# Patient Record
Sex: FEMALE | Race: WHITE | Employment: OTHER | URBAN - METROPOLITAN AREA
[De-identification: names, ages, dates, MRNs, and addresses within clinical notes are randomized per-mention and may not be internally consistent; named-entity substitution may affect disease eponyms.]

---

## 2017-03-29 ENCOUNTER — OFFICE VISIT (OUTPATIENT)
Dept: CARDIOLOGY CLINIC | Age: 82
End: 2017-03-29

## 2017-03-29 VITALS
HEIGHT: 63 IN | HEART RATE: 84 BPM | WEIGHT: 237 LBS | SYSTOLIC BLOOD PRESSURE: 140 MMHG | OXYGEN SATURATION: 96 % | RESPIRATION RATE: 19 BRPM | BODY MASS INDEX: 41.99 KG/M2 | DIASTOLIC BLOOD PRESSURE: 101 MMHG

## 2017-03-29 DIAGNOSIS — E78.5 DYSLIPIDEMIA: ICD-10-CM

## 2017-03-29 DIAGNOSIS — I25.10 ATHEROSCLEROSIS OF NATIVE CORONARY ARTERY OF NATIVE HEART WITHOUT ANGINA PECTORIS: Primary | ICD-10-CM

## 2017-03-29 DIAGNOSIS — I10 ESSENTIAL HYPERTENSION: ICD-10-CM

## 2017-03-29 RX ORDER — ASPIRIN 81 MG/1
81 TABLET ORAL DAILY
Qty: 60 TAB | Refills: 12
Start: 2017-03-29 | End: 2018-10-18 | Stop reason: SDUPTHER

## 2017-03-29 RX ORDER — FEBUXOSTAT 40 MG/1
40 TABLET ORAL DAILY
COMMUNITY
Start: 2017-03-21

## 2017-03-29 RX ORDER — CARVEDILOL 25 MG/1
25 TABLET ORAL 2 TIMES DAILY
Qty: 180 TAB | Refills: 3 | Status: SHIPPED | OUTPATIENT
Start: 2017-03-29 | End: 2018-03-15 | Stop reason: SDUPTHER

## 2017-03-29 NOTE — PROGRESS NOTES
Alexandre Garrison MD. [facility]              Patient: Chichi Diaz  : 1935      Today's Date: 3/29/2017            HISTORY OF PRESENT ILLNESS:     History of Present Illness:  Ms. Yonatan Bullock is here for follow-up. She is living with her daughter. She had bronchitis recently and doing better. She is still SOB. No CP. No orthopnea, but does raise head due to GI problems. BP at home runs 140/80's. PAST MEDICAL HISTORY:     Past Medical History:   Diagnosis Date    Back pain     Chronic kidney disease     Coronary atherosclerosis of native coronary artery     100% LAD stenosis initially medically managed (had seen cardiac surgery).   On 13 underwent PCI of  with EZRA    Dyslipidemia     Hematoma     L and R hematoma post cath 13 requiring 5 units PRBC's    IBS (irritable bowel syndrome)     Insomnia     sleep study  unremarkable    Myalgia     had muscle pain on statins    Obesity     Parathyroid adenoma     surgery 2014    Spinal stenosis     surgery     TIA (transient ischemic attack)     possible TIA 3/14, although workup normal    Unspecified essential hypertension          Past Surgical History:   Procedure Laterality Date    CARDIAC SURG PROCEDURE UNLIST       LAD     HX HYSTERECTOMY      HX LUMBAR FUSION  2013    L4-5    HX TONSILLECTOMY             MEDICATIONS:     Current Outpatient Prescriptions   Medication Sig Dispense Refill    ULORIC 40 mg tab tablet       fluvastatin (LESCOL) 20 mg capsule TAKE 1 CAPSULE BY MOUTH EVERY NIGHT AT BEDTIME 30 Cap 0    carvedilol (COREG) 12.5 mg tablet TAKE 1 TABLET BY MOUTH TWICE A DAY WITH MEALS 60 Tab 0    clopidogrel (PLAVIX) 75 mg tab TAKE 1 TABLET BY MOUTH EVERY DAY 30 Tab 11    isosorbide mononitrate ER (IMDUR) 30 mg tablet TAKE 1 TABLET BY MOUTH EVERY DAY 30 Tab 11    losartan (COZAAR) 50 mg tablet TAKE 2 TABLETS BY MOUTH EVERY DAY 60 Tab 6    fluvastatin (LESCOL) 20 mg capsule TAKE ONE CAPSULE BY MOUTH EVERY NIGHT 30 Cap 2    co-enzyme Q-10 (CO Q-10) 50 mg capsule Take 4 Caps by mouth two (2) times a day. 60 Cap 0    acetaminophen (TYLENOL ARTHRITIS PAIN) 650 mg CR tablet Take 650 mg by mouth every eight (8) hours as needed for Pain.  loperamide (IMODIUM) 2 mg capsule Take 2 mg by mouth as needed for Diarrhea.  sodium bicarbonate 325 mg tablet Take 650 mg by mouth nightly.  aspirin delayed-release 81 mg tablet Take 1 Tab by mouth two (2) times a day. 60 Tab 0    triamcinolone acetonide (KENALOG) 0.1 % topical cream Apply  to affected area two (2) times daily as needed. use thin layer      L GASSERI/B BIFIDUM/B LONGUM (Trubates PO) Take 1 Cap by mouth daily.  therapeutic multivitamin (THERAGRAN) tablet Take 1 Tab by mouth daily.  ferrous sulfate (SLOW FE) 142 mg (45 mg iron) ER tablet Take 142 mg by mouth two (2) times a day.  ezetimibe (ZETIA) 10 mg tablet Take 10 mg by mouth nightly.  cholecalciferol, vitamin D3, (VITAMIN D3) 2,000 unit Tab Take 2,000 Units by mouth two (2) times a day. Allergies   Allergen Reactions    Aloe Vera Rash    Sulfa (Sulfonamide Antibiotics) Rash and Other (comments)     Upset stomach              SOCIAL HISTORY:     Social History   Substance Use Topics    Smoking status: Never Smoker    Smokeless tobacco: Never Used    Alcohol use Yes      Comment: 2 glasses of wine monthly            FAMILY HISTORY:     Family History   Problem Relation Age of Onset    Heart Disease Mother     Stroke Father             REVIEW OF SYMPTOMS:      Review of Symptoms:  Constitutional: Negative for fever, chills  HEENT: Negative for nosebleeds, tinnitus, and vision changes. Respiratory: Negative for cough, wheezing  Cardiovascular: Negative for syncope, and PND. Gastrointestinal: Negative for abdominal pain, diarrhea, melena. Genitourinary: Negative for dysuria  Musculoskeletal: Negative for myalgias.    Skin: + psoriasis   Heme: No problems bleeding. Neurological: Negative for speech change and focal weakness.                  PHYSICAL EXAM:      Physical Exam:  Visit Vitals    BP (!) 140/101 (BP 1 Location: Right arm, BP Patient Position: Sitting)    Pulse 84    Resp 19    Ht 5' 2.5\" (1.588 m)    Wt 237 lb (107.5 kg)    SpO2 96%    BMI 42.66 kg/m2      Patient appears generally well, mood and affect are appropriate and pleasant. HEENT: Hearing intact, non-icteric, normocephalic, atraumatic. Neck Exam: Supple, no bruits or JVD  Lung Exam: Clear to auscultation, even breath sounds. Cardiac Exam: Regular rate and rhythm with no murmur  Abdomen: Soft, non-tender, normal bowel sounds. Extremities: Moves all ext well. Trace lower extremity edema. Psych: Appropriate affect  Neuro - Grossly intact               LABS / OTHER STUDIES:        Labs 2/28/14 - CBC OK  Labs 3/24/15- chol 153, , LDL 54, HDL 52      CARDIAC DIAGNOSTICS:           Cardiac Evaluation Includes:    1. Echo, December 2, 2009: Normal left ventricular size and function, EF 55% to 33%, grade I diastolic dysfunction. Minimal aortic valve sclerosis with trace aortic insufficiency. 2. Cardiac cath, December 11, 2009: Significant one-vessel coronary artery disease. There is a 90% proximal LAD lesion over a long segment followed by a total occlusion in the mid LAD. The distal LAD is small to moderate sized and fills with left-to-left collaterals. There is mild disease elsewhere. LVEDP was 16, LVEF 60%. 3. Lexiscan Myoview 2/10: small, partially reversible apical defect; LVEF 80%    4. Lexiscan Cardiolite 6/24/13 - normal MPPI, LVEF 59%    5. Cath 7/9/13 - 100% pLAD stenosis, LVEF 65%    6. Cath 7/25/13 - EZRA to LAD  7. Holter 2/12/15 - Normal  8. Echo 12/6/15 - LVEF 60 % to 65 %. Grade 1 diastolic dysfunction.  Mild LAE  9.  CATH: 12/7/2015: L Main: Nml, LAD: Stent patent; dLAD: small - patent/diffuse dz; o/pD1/D2 mild dz, LCflex: Med to large; MLI; OM1 and OM2 - MLI; RCA: Large; pRCA: 80%;PLB and PDA - med; PDA - prox diffuse 40%, LVEDP: 18, LVEF: 55%  ---- s/p EZRA (3 x 23) -> pRCA             EKG 8/14 - NSR, 1st degree AVB  EKG 2/12/15 - NSR, PRWP  EKG 11/25/15 - NSR, PRWP            ASSESSMENT AND PLAN:        Assessment and Plan:   1) CAD and chest pain  - Cardiac Cath Dec 2009 - Significant 1 vessel CAD - 90% proximal LAD lesion followed by total mid-LAD lesion with distal LAD filling by left to left collatorals and right to distal left collaterals.    - She was managed medically for years (after seeing cardiac surgery).    - Due to worsening symptoms she proceeded to PCI of  on 7/25/13 by Dr. Edmar Looney. Her breathing improved s/p PCI.    - She was pain free after PCI in 2013 until about November 1, 2015. Since then she's had some typical chest pain. She also has indigestion which is different.  She was eventually admitted for chest pain and had a cath.    - CATH: 12/7/2015: L Main: Nml, LAD: Stent patent; dLAD: small - patent/diffuse dz; o/pD1/D2 mild dz, LCflex: Med to large; MLI; OM1 and OM2 - MLI; RCA: Large; pRCA: 80%;PLB and PDA - med; PDA - prox diffuse 40%, LVEDP: 18, LVEF: 55%  ---- s/p EZRA (3 x 23) -> pRCA    - She was admitted to [address] [place] [address] in June 2016 - workup there OK per patient   - No further chest pain   - Cont DAPT for now, but consider stopping plavix next year      2) HTN    - increase Coreg to 25 mg BID  - continue other medications      3) Heart racing  - continue beta-blockers  - Holter 2/15 was normal    - She is doing better lately      4) Stage 3 CKD - followed by Dr. Jannie Burnett      5) Dyslipidemia - She is tolerating fluvastatin (could not tolerate other statins) and Zetia.    - Prior lipids OK     6) See me back in 6 months or earlier based on her BP does. Patient expressed understanding of the plan - questions were answered.    She has 2 daughters - living with the youngest.   Alisson Kaufman MD, [address] [facility] [address]  [facility] [address]  [address] [facility], [facility] [address]  [address], [address]  [place], [address] [place] [address]  Ph: [phone]   Ph [phone]

## 2017-03-29 NOTE — MR AVS SNAPSHOT
Visit Information Date & Time Provider Department Dept. Phone Encounter #  
 3/29/2017  4:00 PM Nayan Foster MD [facility] Mckenna Frausto [phone] [id number] Upcoming Health Maintenance Date Due DTaP/Tdap/Td series (1 - Tdap) 10/17/1956 ZOSTER VACCINE AGE 60> 10/17/1995 GLAUCOMA SCREENING Q2Y 10/17/2000 OSTEOPOROSIS SCREENING (DEXA) 10/17/2000 MEDICARE YEARLY EXAM 10/17/2000 INFLUENZA AGE 9 TO ADULT 8/1/2016 Pneumococcal 65+ Low/Medium Risk (2 of 2 - PPSV23) 1/1/2017 Allergies as of 3/29/2017  Review Complete On: 3/29/2017 By: Nayan Foster MD  
  
 Severity Noted Reaction Type Reactions Aloe Vera  07/22/2013    Rash  
 Sulfa (Sulfonamide Antibiotics)  02/02/2011    Rash, Other (comments) Upset stomach Current Immunizations  Reviewed on 12/4/2015 Name Date Influenza Vaccine (Madin Dover Canine Kidney) PF 12/8/2015 11:40 AM  
 Pneumococcal Vaccine (Unspecified Type) 1/1/2012 Not reviewed this visit Vitals BP Pulse Resp Height(growth percentile) Weight(growth percentile) SpO2  
 (!) 140/101 (BP 1 Location: Right arm, BP Patient Position: Sitting) 84 19 5' 2.5\" (1.588 m) 237 lb (107.5 kg) 96% BMI OB Status Smoking Status 42.66 kg/m2 Hysterectomy Never Smoker Vitals History BMI and BSA Data Body Mass Index Body Surface Area  
 42.66 kg/m 2 2.18 m 2 Preferred Pharmacy Pharmacy Name Phone [id number] [place], [address]) [phone] Your Updated Medication List  
  
   
This list is accurate as of: 3/29/17  4:38 PM.  Always use your most recent med list.  
  
  
  
  
 aspirin delayed-release 81 mg tablet Take 1 Tab by mouth daily. carvedilol 25 mg tablet Commonly known as:  Lisa Buster Take 1 Tab by mouth two (2) times a day. clopidogrel 75 mg Tab Commonly known as:  PLAVIX TAKE 1 TABLET BY MOUTH EVERY DAY  
  
 co-enzyme Q-10 50 mg capsule Commonly known as:  CO Q-10 Take 4 Caps by mouth two (2) times a day. ferrous sulfate 142 mg (45 mg iron) ER tablet Commonly known as:  SLOW FE Take 142 mg by mouth two (2) times a day. * fluvastatin 20 mg capsule Commonly known as:  LESCOL TAKE ONE CAPSULE BY MOUTH EVERY NIGHT * fluvastatin 20 mg capsule Commonly known as:  LESCOL TAKE 1 CAPSULE BY MOUTH EVERY NIGHT AT BEDTIME  
  
 isosorbide mononitrate ER 30 mg tablet Commonly known as:  IMDUR  
TAKE 1 TABLET BY MOUTH EVERY DAY  
  
 loperamide 2 mg capsule Commonly known as:  IMODIUM Take 2 mg by mouth as needed for Diarrhea.  
  
 losartan 50 mg tablet Commonly known as:  COZAAR  
TAKE 2 TABLETS BY MOUTH EVERY DAY [address] Take 1 Cap by mouth daily. sodium bicarbonate 325 mg tablet Take 650 mg by mouth nightly. therapeutic multivitamin tablet Commonly known as:  [facility] Take 1 Tab by mouth daily. triamcinolone acetonide 0.1 % topical cream  
Commonly known as:  KENALOG Apply  to affected area two (2) times daily as needed. use thin layer TYLENOL ARTHRITIS PAIN 650 mg CR tablet Generic drug:  acetaminophen Take 650 mg by mouth every eight (8) hours as needed for Pain. ULORIC 40 mg Tab tablet Generic drug:  febuxostat VITAMIN D3 2,000 unit Tab Generic drug:  cholecalciferol (vitamin D3) Take 2,000 Units by mouth two (2) times a day. ZETIA 10 mg tablet Generic drug:  ezetimibe Take 10 mg by mouth nightly. * Notice: This list has 2 medication(s) that are the same as other medications prescribed for you. Read the directions carefully, and ask your doctor or other care provider to review them with you. Prescriptions Sent to Pharmacy Refills  
 carvedilol (COREG) 25 mg tablet 3 Sig: Take 1 Tab by mouth two (2) times a day.   
 Class: Normal  
 Pharmacy: [id number]- [address], [address] [address]) Ph #: [phone] Route: Oral  
  
Introducing [facility]! Dear Rayne Vidal: Thank you for requesting a [employer] Program account. Our records indicate that you already have an active [employer] Program account. You can access your account anytime at [email or web address] Did you know that you can access your hospital and ER discharge instructions at any time in [employer] Program? You can also review all of your test results from your hospital stay or ER visit. Additional Information If you have questions, please visit the Frequently Asked Questions section of the [employer] Program website at [email or web address] Remember, [employer] Program is NOT to be used for urgent needs. For medical emergencies, dial 911. Now available from your iPhone and Android! Please provide this summary of care documentation to your next provider. Your primary care clinician is listed as Kristian Rollins. If you have any questions after today's visit, please call [phone].

## 2017-03-29 NOTE — PROGRESS NOTES
Visit Vitals    BP (!) 140/101 (BP 1 Location: Right arm, BP Patient Position: Sitting)    Pulse 84    Resp 19    Ht 5' 2.5\" (1.588 m)    Wt 237 lb (107.5 kg)    SpO2 96%    BMI 42.66 kg/m2

## 2017-05-15 DIAGNOSIS — I10 ESSENTIAL HYPERTENSION: ICD-10-CM

## 2017-05-15 RX ORDER — LOSARTAN POTASSIUM 50 MG/1
TABLET ORAL
Qty: 60 TAB | Refills: 6 | Status: SHIPPED | OUTPATIENT
Start: 2017-05-15 | End: 2017-11-21 | Stop reason: SDUPTHER

## 2017-10-05 ENCOUNTER — OFFICE VISIT (OUTPATIENT)
Dept: CARDIOLOGY CLINIC | Age: 82
End: 2017-10-05

## 2017-10-05 VITALS
DIASTOLIC BLOOD PRESSURE: 84 MMHG | OXYGEN SATURATION: 94 % | BODY MASS INDEX: 41.94 KG/M2 | SYSTOLIC BLOOD PRESSURE: 132 MMHG | WEIGHT: 233 LBS | HEART RATE: 80 BPM

## 2017-10-05 DIAGNOSIS — E78.5 DYSLIPIDEMIA: ICD-10-CM

## 2017-10-05 DIAGNOSIS — I25.10 ATHEROSCLEROSIS OF NATIVE CORONARY ARTERY OF NATIVE HEART WITHOUT ANGINA PECTORIS: ICD-10-CM

## 2017-10-05 DIAGNOSIS — I10 ESSENTIAL HYPERTENSION: Primary | ICD-10-CM

## 2017-10-05 RX ORDER — FLUTICASONE PROPIONATE 50 MCG
2 SPRAY, SUSPENSION (ML) NASAL DAILY
COMMUNITY

## 2017-10-05 NOTE — PROGRESS NOTES
Sukumar Mayberry MD. [facility]              Patient: Shaggy Esquivel  : 1935      Today's Date: 10/5/2017            HISTORY OF PRESENT ILLNESS:     History of Present Illness:  Ms. Sebas Flores is here for follow-up. No major complaints. Hard time losing weight. No CP or major SOB. Chronic FRANKLIN. Recently had bronchitis. PAST MEDICAL HISTORY:     Past Medical History:   Diagnosis Date    Back pain     Chronic kidney disease     Coronary atherosclerosis of native coronary artery     100% LAD stenosis initially medically managed (had seen cardiac surgery). On 13 underwent PCI of  with EZRA    Dyslipidemia     Hematoma     L and R hematoma post cath 13 requiring 5 units PRBC's    IBS (irritable bowel syndrome)     Insomnia     sleep study  unremarkable    Myalgia     had muscle pain on statins    Obesity     Parathyroid adenoma     surgery 2014    Spinal stenosis     surgery     TIA (transient ischemic attack)     possible TIA 3/14, although workup normal    Unspecified essential hypertension          Past Surgical History:   Procedure Laterality Date    CARDIAC SURG PROCEDURE UNLIST       LAD     HX HYSTERECTOMY      HX LUMBAR FUSION  2013    L4-5    HX TONSILLECTOMY             MEDICATIONS:     Current Outpatient Prescriptions   Medication Sig Dispense Refill    fluticasone (FLONASE) 50 mcg/actuation nasal spray 2 Sprays by Both Nostrils route daily.  losartan (COZAAR) 50 mg tablet TAKE 2 TABLETS BY MOUTH EVERY DAY 60 Tab 6    ULORIC 40 mg tab tablet       carvedilol (COREG) 25 mg tablet Take 1 Tab by mouth two (2) times a day. 180 Tab 3    aspirin delayed-release 81 mg tablet Take 1 Tab by mouth daily.  (Patient taking differently: Take 81 mg by mouth two (2) times a day.) 60 Tab 12    clopidogrel (PLAVIX) 75 mg tab TAKE 1 TABLET BY MOUTH EVERY DAY 30 Tab 11    isosorbide mononitrate ER (IMDUR) 30 mg tablet TAKE 1 TABLET BY MOUTH EVERY DAY 30 Tab 11    fluvastatin (LESCOL) 20 mg capsule TAKE ONE CAPSULE BY MOUTH EVERY NIGHT 30 Cap 2    co-enzyme Q-10 (CO Q-10) 50 mg capsule Take 4 Caps by mouth two (2) times a day. 60 Cap 0    acetaminophen (TYLENOL ARTHRITIS PAIN) 650 mg CR tablet Take 650 mg by mouth every eight (8) hours as needed for Pain.  loperamide (IMODIUM) 2 mg capsule Take 2 mg by mouth as needed for Diarrhea.  sodium bicarbonate 325 mg tablet Take 650 mg by mouth three (3) times daily.  triamcinolone acetonide (KENALOG) 0.1 % topical cream Apply  to affected area two (2) times daily as needed. use thin layer      L GASSERI/B BIFIDUM/B LONGUM (ActiveGift PO) Take 1 Cap by mouth daily.  therapeutic multivitamin (THERAGRAN) tablet Take 1 Tab by mouth daily.  ferrous sulfate (SLOW FE) 142 mg (45 mg iron) ER tablet Take 142 mg by mouth two (2) times a day.  ezetimibe (ZETIA) 10 mg tablet Take 10 mg by mouth nightly.  cholecalciferol, vitamin D3, (VITAMIN D3) 2,000 unit Tab Take 2,000 Units by mouth two (2) times a day. Allergies   Allergen Reactions    Aloe Vera Rash    Hay Fever And Allergy Relief Runny Nose and Cough    Sulfa (Sulfonamide Antibiotics) Rash and Other (comments)     Upset stomach              SOCIAL HISTORY:     Social History   Substance Use Topics    Smoking status: Never Smoker    Smokeless tobacco: Never Used    Alcohol use Yes      Comment: 2 glasses of wine monthly            FAMILY HISTORY:     Family History   Problem Relation Age of Onset    Heart Disease Mother     Stroke Father             REVIEW OF SYMPTOMS:       Review of Symptoms:  Constitutional: Negative for fever, chills  HEENT: Negative for nosebleeds, tinnitus, and vision changes. Respiratory: Negative for cough, wheezing  Cardiovascular: Negative for syncope, and PND. Gastrointestinal: Negative for abdominal pain,  melena.    Genitourinary: Negative for dysuria  Musculoskeletal: Negative for myalgias. Skin: + psoriasis   Heme: No problems bleeding. Neurological: Negative for speech change and focal weakness. + occ diarrhea                   PHYSICAL EXAM:       Physical Exam:  Visit Vitals    /84 (BP 1 Location: Left arm, BP Patient Position: Sitting)    Pulse 80    Wt 233 lb (105.7 kg)    SpO2 94%    BMI 41.94 kg/m2       Patient appears generally well, mood and affect are appropriate and pleasant. HEENT: Hearing intact, non-icteric, normocephalic, atraumatic. Neck Exam: Supple, no bruits or JVD  Lung Exam: Clear to auscultation, even breath sounds. Cardiac Exam: Regular rate and rhythm with no murmur  Abdomen: Soft, non-tender, normal bowel sounds. Extremities: Moves all ext well. Trace lower extremity edema. Psych: Appropriate affect  Neuro - Grossly intact                  LABS / OTHER STUDIES:         Labs 2/28/14 - CBC OK  Labs 3/24/15- chol 153, , LDL 54, HDL 52  Lab Results   Component Value Date/Time    Sodium 136 12/08/2015 04:08 AM    Potassium 4.7 12/08/2015 04:08 AM    Chloride 106 12/08/2015 04:08 AM    CO2 20 12/08/2015 04:08 AM    Anion gap 10 12/08/2015 04:08 AM    Glucose 103 12/08/2015 04:08 AM    BUN 22 12/08/2015 04:08 AM    Creatinine 1.31 12/08/2015 04:08 AM    BUN/Creatinine ratio 17 12/08/2015 04:08 AM    GFR est AA 47 12/08/2015 04:08 AM    GFR est non-AA 39 12/08/2015 04:08 AM    Calcium 8.8 12/08/2015 04:08 AM    Bilirubin, total 0.4 03/07/2014 02:15 PM    AST (SGOT) 24 03/07/2014 02:15 PM    Alk.  phosphatase 99 03/07/2014 02:15 PM    Protein, total 7.0 03/07/2014 02:15 PM    Albumin 3.8 03/07/2014 02:15 PM    Globulin 3.2 03/07/2014 02:15 PM    A-G Ratio 1.2 03/07/2014 02:15 PM    ALT (SGPT) 28 03/07/2014 02:15 PM     Lab Results   Component Value Date/Time    WBC 5.8 12/05/2015 03:00 AM    Hemoglobin (POC) 10.9 12/04/2015 06:10 PM    HGB 9.3 12/07/2015 05:45 PM    Hematocrit (POC) 32 12/04/2015 06:10 PM    HCT 28.4 12/07/2015 05:45 PM    PLATELET 787 53/99/5238 03:00 AM    MCV 98.7 12/05/2015 03:00 AM     Lab Results   Component Value Date/Time    Cholesterol, total 151 12/06/2015 04:13 AM    HDL Cholesterol 51 12/06/2015 04:13 AM    LDL, calculated 67.2 12/06/2015 04:13 AM    VLDL, calculated 32.8 12/06/2015 04:13 AM    Triglyceride 164 12/06/2015 04:13 AM    CHOL/HDL Ratio 3.0 12/06/2015 04:13 AM       Labs 3/17 - CMP OK, chol 147, LDL 59, , HDL 49        CARDIAC DIAGNOSTICS:             Cardiac Evaluation Includes:    1. Echo, December 2, 2009: Normal left ventricular size and function, EF 55% to 24%, grade I diastolic dysfunction. Minimal aortic valve sclerosis with trace aortic insufficiency. 2. Cardiac cath, December 11, 2009: Significant one-vessel coronary artery disease. There is a 90% proximal LAD lesion over a long segment followed by a total occlusion in the mid LAD. The distal LAD is small to moderate sized and fills with left-to-left collaterals. There is mild disease elsewhere. LVEDP was 16, LVEF 60%. 3. Lexiscan Myoview 2/10: small, partially reversible apical defect; LVEF 80%    4. Lexiscan Cardiolite 6/24/13 - normal MPPI, LVEF 59%    5. Cath 7/9/13 - 100% pLAD stenosis, LVEF 65%    6. Cath 7/25/13 - EZRA to LAD  7. Holter 2/12/15 - Normal  8. Echo 12/6/15 - LVEF 60 % to 65 %. Grade 1 diastolic dysfunction.  Mild LAE  9.  CATH: 12/7/2015: L Main: Nml, LAD: Stent patent; dLAD: small - patent/diffuse dz; o/pD1/D2 mild dz, LCflex: Med to large; MLI; OM1 and OM2 - MLI; RCA: Large; pRCA: 80%;PLB and PDA - med; PDA - prox diffuse 40%, LVEDP: 18, LVEF: 55%  ---- s/p EZRA (3 x 23) -> pRCA            EKG 8/14 - NSR, 1st degree AVB  EKG 2/12/15 - NSR, PRWP  EKG 11/25/15 - NSR, PRWP  EKG 10/5/17 - NSR, PRWP              ASSESSMENT AND PLAN:         Assessment and Plan:   1) CAD and chest pain  - Cardiac Cath Dec 2009 - Significant 1 vessel CAD - 90% proximal LAD lesion followed by total mid-LAD lesion with distal LAD filling by left to left collatorals and right to distal left collaterals.    - She was managed medically for years (after seeing cardiac surgery).    - Due to worsening symptoms she proceeded to PCI of  on 7/25/13 by Dr. Kip Barton. Her breathing improved s/p PCI.    - She was pain free after PCI in 2013 until about November 1, 2015. Since then she's had some typical chest pain. She also has indigestion which is different.  She was eventually admitted for chest pain and had a cath.    - CATH: 12/7/2015: L Main: Nml, LAD: Stent patent; dLAD: small - patent/diffuse dz; o/pD1/D2 mild dz, LCflex: Med to large; MLI; OM1 and OM2 - MLI; RCA: Large; pRCA: 80%;PLB and PDA - med; PDA - prox diffuse 40%, LVEDP: 18, LVEF: 55%  ---- s/p EZRA (3 x 23) -> pRCA    - She was admitted to [address] [place] [address] in June 2016 - workup there OK per patient   - No further chest pain   - stop plavix and continue ASA 81 mg BID       2) HTN    - BP looks good   - continue medications       3) Heart racing  - continue beta-blockers  - Holter 2/15 was normal    - She is doing better lately       4) Stage 3 CKD - followed by Dr. Angelic Vo  5) Dyslipidemia   - She is tolerating fluvastatin (could not tolerate other statins) and Zetia.    - Prior lipids OK  - Will get recent labs to review       6) See me back in 6 months or earlier based on her BP does. Patient expressed understanding of the plan - questions were answered. She has 2 daughters - living with the youngest.   Has 3 great grandkids.        Griselda Sterling MD, [address]  [address] [facility], [address]  [address] [place] [address]  [address], [facility], [address] [place] [address]  Ph: [phone]   Ph [phone]

## 2017-10-05 NOTE — PROGRESS NOTES
Pt complains of shortness of breath      Visit Vitals    /84 (BP 1 Location: Left arm, BP Patient Position: Sitting)    Pulse 80    Wt 233 lb (105.7 kg)    SpO2 94%    BMI 41.94 kg/m2

## 2017-11-21 DIAGNOSIS — I10 ESSENTIAL HYPERTENSION: ICD-10-CM

## 2017-11-21 RX ORDER — LOSARTAN POTASSIUM 50 MG/1
TABLET ORAL
Qty: 60 TAB | Refills: 5 | Status: SHIPPED | OUTPATIENT
Start: 2017-11-21 | End: 2018-03-20 | Stop reason: SDUPTHER

## 2017-11-21 RX ORDER — ISOSORBIDE MONONITRATE 30 MG/1
TABLET, EXTENDED RELEASE ORAL
Qty: 30 TAB | Refills: 10 | Status: SHIPPED | OUTPATIENT
Start: 2017-11-21 | End: 2018-05-17 | Stop reason: SDUPTHER

## 2018-03-16 RX ORDER — FLUVASTATIN 20 MG/1
CAPSULE ORAL
Qty: 30 CAP | Refills: 10 | Status: SHIPPED | OUTPATIENT
Start: 2018-03-16 | End: 2018-05-03 | Stop reason: SDUPTHER

## 2018-03-16 RX ORDER — CARVEDILOL 25 MG/1
TABLET ORAL
Qty: 180 TAB | Refills: 3 | Status: SHIPPED | OUTPATIENT
Start: 2018-03-16 | End: 2019-03-04 | Stop reason: SDUPTHER

## 2018-03-20 DIAGNOSIS — I10 ESSENTIAL HYPERTENSION: ICD-10-CM

## 2018-03-26 RX ORDER — LOSARTAN POTASSIUM 50 MG/1
TABLET ORAL
Qty: 60 TAB | Refills: 5 | Status: SHIPPED | OUTPATIENT
Start: 2018-03-26 | End: 2018-05-03 | Stop reason: SDUPTHER

## 2018-05-17 DIAGNOSIS — I10 ESSENTIAL HYPERTENSION: ICD-10-CM

## 2018-05-17 RX ORDER — LOSARTAN POTASSIUM 50 MG/1
TABLET ORAL
Qty: 180 TAB | Refills: 1 | Status: SHIPPED | OUTPATIENT
Start: 2018-05-17 | End: 2019-03-04 | Stop reason: SDUPTHER

## 2018-05-17 RX ORDER — ISOSORBIDE MONONITRATE 30 MG/1
30 TABLET, EXTENDED RELEASE ORAL DAILY
Qty: 90 TAB | Refills: 1 | Status: SHIPPED | OUTPATIENT
Start: 2018-05-17 | End: 2018-12-10 | Stop reason: SDUPTHER

## 2018-10-18 ENCOUNTER — OFFICE VISIT (OUTPATIENT)
Dept: CARDIOLOGY CLINIC | Age: 83
End: 2018-10-18

## 2018-10-18 VITALS
HEIGHT: 63 IN | RESPIRATION RATE: 16 BRPM | BODY MASS INDEX: 40.43 KG/M2 | OXYGEN SATURATION: 96 % | DIASTOLIC BLOOD PRESSURE: 80 MMHG | HEART RATE: 76 BPM | WEIGHT: 228.2 LBS | SYSTOLIC BLOOD PRESSURE: 138 MMHG

## 2018-10-18 DIAGNOSIS — I10 ESSENTIAL HYPERTENSION: ICD-10-CM

## 2018-10-18 DIAGNOSIS — I25.10 CORONARY ARTERY DISEASE INVOLVING NATIVE CORONARY ARTERY OF NATIVE HEART WITHOUT ANGINA PECTORIS: Primary | ICD-10-CM

## 2018-10-18 RX ORDER — ASPIRIN 81 MG/1
TABLET ORAL 2 TIMES DAILY
COMMUNITY
End: 2018-10-18

## 2018-10-18 RX ORDER — ASPIRIN 81 MG/1
81 TABLET ORAL DAILY
Qty: 30 TAB | Refills: 0
Start: 2018-10-18

## 2018-10-18 RX ORDER — CHOLECALCIFEROL (VITAMIN D3) 125 MCG
200 CAPSULE ORAL 2 TIMES DAILY
COMMUNITY

## 2018-10-18 NOTE — PROGRESS NOTES
Shermon Dakin, MD. [facility] [place]              Patient: Alta Randolph  : 1935      Today's Date: 10/18/2018            HISTORY OF PRESENT ILLNESS:     History of Present Illness:  Here for follow-up. Biggest complaint is arthritis. Kidney function has been stable. No specific cardiac complaints. A little more SOB. No CP or dizziness. PAST MEDICAL HISTORY:     Past Medical History:   Diagnosis Date    Back pain     Chronic kidney disease     Coronary atherosclerosis of native coronary artery     100% LAD stenosis initially medically managed (had seen cardiac surgery). On 13 underwent PCI of  with EZRA    Dyslipidemia     Hematoma     L and R hematoma post cath 13 requiring 5 units PRBC's    IBS (irritable bowel syndrome)     Insomnia     sleep study  unremarkable    Myalgia     had muscle pain on statins    Obesity     Parathyroid adenoma     surgery 2014    Spinal stenosis     surgery     TIA (transient ischemic attack)     possible TIA 3/14, although workup normal    Unspecified essential hypertension          Past Surgical History:   Procedure Laterality Date    CARDIAC SURG PROCEDURE UNLIST       LAD     HX HYSTERECTOMY      HX LUMBAR FUSION  2013    L4-5    HX TONSILLECTOMY           MEDICATIONS:     Current Outpatient Medications   Medication Sig Dispense Refill    aspirin delayed-release 81 mg tablet Take  by mouth two (2) times a day.  co-enzyme Q-10 (CO Q-10) 100 mg capsule Take 200 mg by mouth two (2) times a day.  isosorbide mononitrate ER (IMDUR) 30 mg tablet Take 1 Tab by mouth daily.  90 Tab 1    losartan (COZAAR) 50 mg tablet TAKE 2 TABLETS BY MOUTH EVERY  Tab 1    fluvastatin (LESCOL) 20 mg capsule TAKE 1 CAPSULE BY MOUTH EVERY NIGHT AT BEDTIME 90 Cap 1    carvedilol (COREG) 25 mg tablet TAKE 1 TABLET BY MOUTH TWICE A  Tab 3    fluticasone (FLONASE) 50 mcg/actuation nasal spray 2 Sprays by Both Nostrils route daily.  ULORIC 40 mg tab tablet Take 40 mg by mouth daily.  acetaminophen (TYLENOL ARTHRITIS PAIN) 650 mg CR tablet Take 650 mg by mouth every eight (8) hours as needed for Pain.  loperamide (IMODIUM) 2 mg capsule Take 2 mg by mouth as needed for Diarrhea.  sodium bicarbonate 325 mg tablet Take two tablets in the morning and one tablet in the evening.  triamcinolone acetonide (KENALOG) 0.1 % topical cream Apply  to affected area two (2) times daily as needed. use thin layer      L GASSERI/B BIFIDUM/B LONGUM (Eye-Q PO) Take 1 Cap by mouth daily.  therapeutic multivitamin (THERAGRAN) tablet Take 1 Tab by mouth daily.  ferrous sulfate (SLOW FE) 142 mg (45 mg iron) ER tablet Take 142 mg by mouth two (2) times a day.  ezetimibe (ZETIA) 10 mg tablet Take 10 mg by mouth nightly.  cholecalciferol, vitamin D3, (VITAMIN D3) 2,000 unit Tab Take 2,000 Units by mouth two (2) times a day. Allergies   Allergen Reactions    Aloe Vera Rash    Hay Fever And Allergy Relief Runny Nose and Cough    Sulfa (Sulfonamide Antibiotics) Rash and Other (comments)     Upset stomach            SOCIAL HISTORY:     Social History     Tobacco Use    Smoking status: Never Smoker    Smokeless tobacco: Never Used   Substance Use Topics    Alcohol use: Yes     Comment: 2 glasses of wine monthly     Drug use: No         FAMILY HISTORY:     Family History   Problem Relation Age of Onset    Heart Disease Mother     Stroke Father               REVIEW OF SYMPTOMS:       Review of Symptoms:  Constitutional: Negative for fever, chills  HEENT: Negative for nosebleeds, tinnitus, and vision changes. Respiratory: Negative for cough, wheezing  Cardiovascular: Negative for syncope, and PND. Gastrointestinal: Negative for abdominal pain,  melena. Genitourinary: Negative for dysuria  Musculoskeletal: Negative for myalgias.    Skin: + psoriasis   Heme: No problems bleeding. Neurological: Negative for speech change and focal weakness. + occ diarrhea                   PHYSICAL EXAM:       Physical Exam:  Visit Vitals  /80 (BP 1 Location: Left arm)   Pulse 76   Resp 16   Ht 5' 2.5\" (1.588 m)   Wt 228 lb 3.2 oz (103.5 kg)   SpO2 96%   BMI 41.07 kg/m²          Patient appears generally well, mood and affect are appropriate and pleasant. HEENT: Hearing intact, non-icteric, normocephalic, atraumatic. Neck Exam: Supple, no bruits or JVD  Lung Exam: Clear to auscultation, even breath sounds. Cardiac Exam: Regular rate and rhythm with no murmur  Abdomen: Soft, non-tender, normal bowel sounds. Extremities: Moves all ext well. Trace lower extremity edema. Psych: Appropriate affect  Neuro - Grossly intact                  LABS / OTHER STUDIES:         Labs 2/28/14 - CBC OK  Labs 3/24/15- chol 153, , LDL 54, HDL 52        Lab Results   Component Value Date/Time     Sodium 136 12/08/2015 04:08 AM     Potassium 4.7 12/08/2015 04:08 AM     Chloride 106 12/08/2015 04:08 AM     CO2 20 12/08/2015 04:08 AM     Anion gap 10 12/08/2015 04:08 AM     Glucose 103 12/08/2015 04:08 AM     BUN 22 12/08/2015 04:08 AM     Creatinine 1.31 12/08/2015 04:08 AM     BUN/Creatinine ratio 17 12/08/2015 04:08 AM     GFR est AA 47 12/08/2015 04:08 AM     GFR est non-AA 39 12/08/2015 04:08 AM     Calcium 8.8 12/08/2015 04:08 AM     Bilirubin, total 0.4 03/07/2014 02:15 PM     AST (SGOT) 24 03/07/2014 02:15 PM     Alk.  phosphatase 99 03/07/2014 02:15 PM     Protein, total 7.0 03/07/2014 02:15 PM     Albumin 3.8 03/07/2014 02:15 PM     Globulin 3.2 03/07/2014 02:15 PM     A-G Ratio 1.2 03/07/2014 02:15 PM     ALT (SGPT) 28 03/07/2014 02:15 PM            Lab Results   Component Value Date/Time     WBC 5.8 12/05/2015 03:00 AM     Hemoglobin (POC) 10.9 12/04/2015 06:10 PM     HGB 9.3 12/07/2015 05:45 PM     Hematocrit (POC) 32 12/04/2015 06:10 PM     HCT 28.4 12/07/2015 05:45 PM     PLATELET 336 12/05/2015 03:00 AM     MCV 98.7 12/05/2015 03:00 AM            Lab Results   Component Value Date/Time     Cholesterol, total 151 12/06/2015 04:13 AM     HDL Cholesterol 51 12/06/2015 04:13 AM     LDL, calculated 67.2 12/06/2015 04:13 AM     VLDL, calculated 32.8 12/06/2015 04:13 AM     Triglyceride 164 12/06/2015 04:13 AM     CHOL/HDL Ratio 3.0 12/06/2015 04:13 AM         Labs 3/17 - CMP OK, chol 147, LDL 59, , HDL 49  Labs 9/4/18 - chol 148, , LDL 58, HDL 47, Hgb 10.8, plt 188         CARDIAC DIAGNOSTICS:             Cardiac Evaluation Includes:    1. Echo, December 2, 2009: Normal left ventricular size and function, EF 55% to 17%, grade I diastolic dysfunction. Minimal aortic valve sclerosis with trace aortic insufficiency. 2. Cardiac cath, December 11, 2009: Significant one-vessel coronary artery disease. There is a 90% proximal LAD lesion over a long segment followed by a total occlusion in the mid LAD. The distal LAD is small to moderate sized and fills with left-to-left collaterals. There is mild disease elsewhere. LVEDP was 16, LVEF 60%. 3. Lexiscan Myoview 2/10: small, partially reversible apical defect; LVEF 80%    4. Lexiscan Cardiolite 6/24/13 - normal MPPI, LVEF 59%    5. Cath 7/9/13 - 100% pLAD stenosis, LVEF 65%    6. Cath 7/25/13 - EZRA to LAD  7. Holter 2/12/15 - Normal  8. Echo 12/6/15 - LVEF 60 % to 65 %. Grade 1 diastolic dysfunction.  Mild LAE  9.  CATH: 12/7/2015: L Main: Nml, LAD: Stent patent; dLAD: small - patent/diffuse dz; o/pD1/D2 mild dz, LCflex: Med to large; MLI; OM1 and OM2 - MLI; RCA: Large; pRCA: 80%;PLB and PDA - med; PDA - prox diffuse 40%, LVEDP: 18, LVEF: 55%  ---- s/p EZRA (3 x 23) -> pRCA            EKG 8/14 - NSR, 1st degree AVB  EKG 2/12/15 - NSR, PRWP  EKG 11/25/15 - NSR, PRWP  EKG 10/5/17 - NSR, PRWP  EKG 10/18/18 - NSR, PRWP, LAD              ASSESSMENT AND PLAN:         Assessment and Plan:   1) CAD and chest pain  - Cardiac Cath Dec 2009 - Significant 1 vessel CAD - 90% proximal LAD lesion followed by total mid-LAD lesion with distal LAD filling by left to left collatorals and right to distal left collaterals.    - She was managed medically for years (after seeing cardiac surgery).    - Due to worsening symptoms she proceeded to PCI of  on 7/25/13 by Dr. Kannan Sorto. Her breathing improved s/p PCI.    - She was pain free after PCI in 2013 until about November 1, 2015. Since then she's had some typical chest pain. She also has indigestion which is different.  She was eventually admitted for chest pain and had a cath.    - CATH: 12/7/2015: L Main: Nml, LAD: Stent patent; dLAD: small - patent/diffuse dz; o/pD1/D2 mild dz, LCflex: Med to large; MLI; OM1 and OM2 - MLI; RCA: Large; pRCA: 80%;PLB and PDA - med; PDA - prox diffuse 40%, LVEDP: 18, LVEF: 55%  ---- s/p EZRA (3 x 23) -> pRCA    - She was admitted to [facility] in June 2016 - workup there OK per patient   - No further chest pain   - cont ASA, statin, BB, Imdur       2) HTN    - BP looks good   - continue medications       3) Heart racing  - continue beta-blockers  - Holter 2/15 was normal    - She is doing better lately       4) Stage 3 CKD - followed by Dr. Jocelyn Caballero  5) Dyslipidemia   - She is tolerating fluvastatin (could not tolerate other statins) and Zetia.    - Recent lipids look good - followed by PCP.     6) See me in one year. Patient expressed understanding of the plan - questions were answered. She has 2 daughters - living with the youngest.   Has 3 great grandkids.          Jeremy Camarena MD, [place] [id number] [facility], [address]  [address]  [address], [address]  Ph: [phone]   Ph [phone]

## 2018-10-18 NOTE — PROGRESS NOTES
Visit Vitals  /80 (BP 1 Location: Left arm)   Pulse 76   Resp 16   Ht 5' 2.5\" (1.588 m)   Wt 228 lb 3.2 oz (103.5 kg)   SpO2 96%   BMI 41.07 kg/m²       Patient states that she is more short of breathe than normal.

## 2018-11-05 RX ORDER — FLUVASTATIN 20 MG/1
CAPSULE ORAL
Qty: 90 CAP | Refills: 1 | Status: SHIPPED | OUTPATIENT
Start: 2018-11-05 | End: 2018-12-17 | Stop reason: SDUPTHER

## 2018-12-12 RX ORDER — ISOSORBIDE MONONITRATE 30 MG/1
TABLET, EXTENDED RELEASE ORAL
Qty: 90 TAB | Refills: 1 | Status: SHIPPED | OUTPATIENT
Start: 2018-12-12 | End: 2019-06-06 | Stop reason: SDUPTHER

## 2018-12-17 RX ORDER — FLUVASTATIN 20 MG/1
CAPSULE ORAL
Qty: 90 CAP | Refills: 1 | Status: SHIPPED | OUTPATIENT
Start: 2018-12-17 | End: 2019-05-03 | Stop reason: SDUPTHER

## 2019-03-04 DIAGNOSIS — I10 ESSENTIAL HYPERTENSION: ICD-10-CM

## 2019-03-06 RX ORDER — LOSARTAN POTASSIUM 50 MG/1
TABLET ORAL
Qty: 180 TAB | Refills: 1 | Status: SHIPPED | OUTPATIENT
Start: 2019-03-06 | End: 2019-10-31

## 2019-03-06 RX ORDER — CARVEDILOL 25 MG/1
TABLET ORAL
Qty: 180 TAB | Refills: 3 | Status: SHIPPED | OUTPATIENT
Start: 2019-03-06 | End: 2020-02-27 | Stop reason: SDUPTHER

## 2019-05-01 ENCOUNTER — TELEPHONE (OUTPATIENT)
Dept: CARDIOLOGY CLINIC | Age: 84
End: 2019-05-01

## 2019-05-01 NOTE — TELEPHONE ENCOUNTER
[facility] is requesting an alternative for fluvastatin 20mg, he states that it has been on back order. Please advise.     Phone #: [phone]  Thanks

## 2019-05-02 NOTE — TELEPHONE ENCOUNTER
Spoke to pt about prescription being backordered. Per Dr. [place] talk to patient. Mary Dowling could not tolerate other statins and could only tolerate fluvastatin. Options are to just wait for when fluvastatin is available. Onur Meier she wants, she can try Crestor 5 mg daily.   Her choice. \"  Pt stated that she had tried Crestor years ago and had the side effects of leg aches and pains. She ran out of fluvastatin in January and the pharmacy has been on back order since then. She said that she is willing to try Crestor again but she is going to see her PCP this next week and will most likely to lipids again then. I asked her to be sure to get a copy of the labs to us and we will see how her triglycerides. We discussed diet changes to help naturally lower triglycerides in the mean time.

## 2019-05-03 RX ORDER — FLUVASTATIN 20 MG/1
CAPSULE ORAL
Qty: 90 CAP | Refills: 1 | Status: SHIPPED | OUTPATIENT
Start: 2019-05-03 | End: 2020-02-04

## 2019-06-10 RX ORDER — ISOSORBIDE MONONITRATE 30 MG/1
TABLET, EXTENDED RELEASE ORAL
Qty: 90 TAB | Refills: 1 | Status: SHIPPED | OUTPATIENT
Start: 2019-06-10 | End: 2019-12-05 | Stop reason: SDUPTHER

## 2019-10-31 ENCOUNTER — OFFICE VISIT (OUTPATIENT)
Dept: CARDIOLOGY CLINIC | Age: 84
End: 2019-10-31

## 2019-10-31 VITALS
HEART RATE: 82 BPM | BODY MASS INDEX: 41.46 KG/M2 | WEIGHT: 234 LBS | HEIGHT: 63 IN | DIASTOLIC BLOOD PRESSURE: 86 MMHG | OXYGEN SATURATION: 92 % | SYSTOLIC BLOOD PRESSURE: 136 MMHG | RESPIRATION RATE: 20 BRPM

## 2019-10-31 DIAGNOSIS — I10 ESSENTIAL HYPERTENSION: ICD-10-CM

## 2019-10-31 DIAGNOSIS — I25.10 ATHEROSCLEROSIS OF NATIVE CORONARY ARTERY OF NATIVE HEART WITHOUT ANGINA PECTORIS: Primary | ICD-10-CM

## 2019-10-31 NOTE — PROGRESS NOTES
Vasyl Sigala MD. [facility] [place]              Patient: [place]  : 1935      Today's Date: 10/31/2019            HISTORY OF PRESENT ILLNESS:     History of Present Illness:  Here for follow-up. Has had some knee problems - got steroid injection. Stable cardiac carrasco. Seeing Dr. Minerva Enciso for CKD - was in ER for high K+ lately. No CP or SOB. No syncope. Had stroke like symptoms at [address] - turned out to be migraine she says. PAST MEDICAL HISTORY:     Past Medical History:   Diagnosis Date    Back pain     Chronic kidney disease     Coronary atherosclerosis of native coronary artery     100% LAD stenosis initially medically managed (had seen cardiac surgery). On 13 underwent PCI of  with EZRA    Dyslipidemia     Hematoma     L and R hematoma post cath 13 requiring 5 units PRBC's    IBS (irritable bowel syndrome)     Insomnia     sleep study  unremarkable    Myalgia     had muscle pain on statins    Obesity     Parathyroid adenoma     surgery 2014    Spinal stenosis     surgery     TIA (transient ischemic attack)     possible TIA 3/14, although workup normal    Unspecified essential hypertension          Past Surgical History:   Procedure Laterality Date    CARDIAC SURG PROCEDURE UNLIST       LAD     HX HYSTERECTOMY      HX LUMBAR FUSION  2013    L4-5    HX TONSILLECTOMY             MEDICATIONS:     Current Outpatient Medications   Medication Sig Dispense Refill    mag carb/aluminum hydrox/algin (GAVISCON EXTRA STRENGTH PO) Take 2 Tabs by mouth as needed.  isosorbide mononitrate ER (IMDUR) 30 mg tablet TAKE 1 TABLET BY MOUTH EVERY DAY 90 Tab 1    fluvastatin (LESCOL) 20 mg capsule daily 90 Cap 1    carvedilol (COREG) 25 mg tablet TAKE 1 TABLET BY MOUTH TWICE A  Tab 3    co-enzyme Q-10 (CO Q-10) 100 mg capsule Take 200 mg by mouth two (2) times a day.  aspirin delayed-release 81 mg tablet Take 1 Tab by mouth daily.  27 Tab 0    fluticasone (FLONASE) 50 mcg/actuation nasal spray 2 Sprays by Both Nostrils route daily.  ULORIC 40 mg tab tablet Take 40 mg by mouth daily.  acetaminophen (TYLENOL ARTHRITIS PAIN) 650 mg CR tablet Take 650 mg by mouth every eight (8) hours as needed for Pain.  loperamide (IMODIUM) 2 mg capsule Take 2 mg by mouth as needed for Diarrhea.  sodium bicarbonate 325 mg tablet Take two tablets in the morning and one tablet in the evening.  triamcinolone acetonide (KENALOG) 0.1 % topical cream Apply  to affected area two (2) times daily as needed. use thin layer      L GASSERI/B BIFIDUM/B LONGUM ([employer] PO) Take 1 Cap by mouth daily.  therapeutic multivitamin (THERAGRAN) tablet Take 1 Tab by mouth daily.  ferrous sulfate (SLOW FE) 142 mg (45 mg iron) ER tablet Take 142 mg by mouth two (2) times a day.  ezetimibe (ZETIA) 10 mg tablet Take 10 mg by mouth nightly.  cholecalciferol, vitamin D3, (VITAMIN D3) 2,000 unit Tab Take 2,000 Units by mouth two (2) times a day. Allergies   Allergen Reactions    Aloe Vera Rash    Hay Fever And Allergy Relief Runny Nose and Cough    Sulfa (Sulfonamide Antibiotics) Rash and Other (comments)     Upset stomach              SOCIAL HISTORY:     Social History     Tobacco Use    Smoking status: Never Smoker    Smokeless tobacco: Never Used   Substance Use Topics    Alcohol use: Yes     Alcohol/week: 2.0 standard drinks     Types: 2 Glasses of wine per week    Drug use: No         FAMILY HISTORY:     Family History   Problem Relation Age of Onset    Heart Disease Mother     Stroke Father             REVIEW OF SYMPTOMS:       Review of Symptoms:  Constitutional: Negative for fever, chills  HEENT: Negative for nosebleeds, tinnitus, and vision changes. Respiratory: Negative for cough, wheezing  Cardiovascular: Negative for syncope, and PND.    Gastrointestinal: Negative for melena.  + IBS  Genitourinary: Negative for dysuria  Musculoskeletal: Negative for myalgias. + joint pain   Skin: + psoriasis   Heme: No problems bleeding. Neurological: Negative for speech change and focal weakness.   + occ diarrhea                   PHYSICAL EXAM:       Physical Exam:  Visit Vitals  /86 (BP 1 Location: Left arm, BP Patient Position: Sitting)   Pulse 82   Resp 20   Ht 5' 2.5\" (1.588 m)   Wt 234 lb (106.1 kg)   SpO2 92%   BMI 42.12 kg/m²          Patient appears generally well, mood and affect are appropriate and pleasant. HEENT: Hearing intact, non-icteric, normocephalic, atraumatic. Neck Exam: Supple, no bruits or JVD  Lung Exam: Clear to auscultation, even breath sounds. Cardiac Exam: Regular rate and rhythm with no murmur  Abdomen: Soft, non-tender, normal bowel sounds. Extremities: Moves all ext well. No lower extremity edema. Psych: Appropriate affect  Neuro - Grossly intact                  LABS / OTHER STUDIES:            Labs 3/17 - CMP OK, chol 147, LDL 59, , HDL 49  Labs 9/4/18 - chol 148, , LDL 58, HDL 47, Hgb 10.8, plt 188           CARDIAC DIAGNOSTICS:             Cardiac Evaluation Includes:    1. Echo, December 2, 2009: Normal left ventricular size and function, EF 55% to 53%, grade I diastolic dysfunction. Minimal aortic valve sclerosis with trace aortic insufficiency. 2. Cardiac cath, December 11, 2009: Significant one-vessel coronary artery disease. There is a 90% proximal LAD lesion over a long segment followed by a total occlusion in the mid LAD. The distal LAD is small to moderate sized and fills with left-to-left collaterals. There is mild disease elsewhere. LVEDP was 16, LVEF 60%. 3. Lexiscan Myoview 2/10: small, partially reversible apical defect; LVEF 80%    4. Lexiscan Cardiolite 6/24/13 - normal MPPI, LVEF 59%    5. Cath 7/9/13 - 100% pLAD stenosis, LVEF 65%    6. Cath 7/25/13 - EZRA to LAD  7. Holter 2/12/15 - Normal  8. Echo 12/6/15 - LVEF 60 % to 65 %.  Grade 1 diastolic dysfunction.  Mild LAE  9. CATH: 12/7/2015: L Main: Nml, LAD: Stent patent; dLAD: small - patent/diffuse dz; o/pD1/D2 mild dz, LCflex: Med to large; MLI; OM1 and OM2 - MLI; RCA: Large; pRCA: 80%;PLB and PDA - med; PDA - prox diffuse 40%, LVEDP: 18, LVEF: 55%  ---- s/p EZRA (3 x 23) -> pRCA            EKG 8/14 - NSR, 1st degree AVB  EKG 2/12/15 - NSR, PRWP  EKG 11/25/15 - NSR, PRWP  EKG 10/5/17 - NSR, PRWP  EKG 10/18/18 - NSR, PRWP, LAD  EKG 10/31/19 - NSR, PRWP              ASSESSMENT AND PLAN:         Assessment and Plan:   1) CAD and chest pain  - Cardiac Cath Dec 2009 - Significant 1 vessel CAD - 90% proximal LAD lesion followed by total mid-LAD lesion with distal LAD filling by left to left collatorals and right to distal left collaterals.    - She was managed medically for years (after seeing cardiac surgery).    - Due to worsening symptoms she proceeded to PCI of  on 7/25/13 by Dr. Alejandro Velazquez. Her breathing improved s/p PCI.    - She was pain free after PCI in 2013 until about November 1, 2015. Since then she's had some typical chest pain. She also has indigestion which is different.  She was eventually admitted for chest pain and had a cath.    - CATH: 12/7/2015: L Main: Nml, LAD: Stent patent; dLAD: small - patent/diffuse dz; o/pD1/D2 mild dz, LCflex: Med to large; MLI; OM1 and OM2 - MLI; RCA: Large; pRCA: 80%;PLB and PDA - med; PDA - prox diffuse 40%, LVEDP: 18, LVEF: 55%  ---- s/p EZRA (3 x 23) -> pRCA    - She was admitted to [address] [place] [address] in June 2016 - workup there OK per patient   - No further chest pain   - cont ASA, statin, BB, Imdur       2) HTN    - BP looks good   - continue medications       3) Heart racing  - continue beta-blockers  - Holter 2/15 was normal    - She is doing better lately       4) Stage 3 CKD - followed by Dr. Sharif Persaud  5) Dyslipidemia   - She is tolerating fluvastatin (could not tolerate other statins) and Zetia.    - Prior lipids look good - followed by PCP.        6) See me in one year.   Patient expressed understanding of the plan - questions were answered. She has 2 daughters - living with the youngest.   Has 3 great grandkids. 82 Govind Garcia MD, [address], [address]      [address]  [address], [address].                 Govind, [address] [place] [address]  Ph: [phone]                                [phone]

## 2019-10-31 NOTE — PROGRESS NOTES
ROOM # 4    2 admissions @ - in July for stroke like symptoms, diagnosed with severe migraine and kidney issues    Denies any cardiac complaints at this time    Frequent cough    Visit Vitals  /86 (BP 1 Location: Left arm, BP Patient Position: Sitting)   Pulse 82   Resp 20   Ht 5' 2.5\" (1.588 m)   Wt 234 lb (106.1 kg)   SpO2 92%   BMI 42.12 kg/m²

## 2019-12-09 RX ORDER — ISOSORBIDE MONONITRATE 30 MG/1
TABLET, EXTENDED RELEASE ORAL
Qty: 90 TAB | Refills: 3 | Status: SHIPPED | OUTPATIENT
Start: 2019-12-09 | End: 2020-12-23

## 2019-12-09 NOTE — TELEPHONE ENCOUNTER
Per Dr. Leann William VO:  LOV:10/31/2019  Future Appointments   Date Time Provider Shannon Karissa   11/12/2020  2:20 PM [facility]     Requested Prescriptions     Pending Prescriptions Disp Refills    isosorbide mononitrate ER (IMDUR) 30 mg tablet [Pharmacy Med Name: [facility] ER 30 MG TB] 90 Tab 1     Sig: TAKE 1 TABLET BY MOUTH EVERY DAY

## 2020-02-04 RX ORDER — FLUVASTATIN 20 MG/1
CAPSULE ORAL
Qty: 90 CAP | Refills: 3 | Status: SHIPPED | OUTPATIENT
Start: 2020-02-04 | End: 2021-01-28

## 2020-02-04 NOTE — TELEPHONE ENCOUNTER
Per Dr. Alfreda Chapman VO:  LOV:10/31/2019  Future Appointments   Date Time Provider Shannon Hancock   11/12/2020  2:20 PM Tristan Buerger, MD [facility]     Requested Prescriptions     Pending Prescriptions Disp Refills    fluvastatin (LESCOL) 20 mg capsule [Pharmacy Med Name: FLUVASTATIN SODIUM 20 MG CAP] 90 Cap 1     Sig: TAKE ONE CAPSULE BY MOUTH EVERY DAY

## 2020-02-27 ENCOUNTER — TELEPHONE (OUTPATIENT)
Dept: CARDIOLOGY CLINIC | Age: 85
End: 2020-02-27

## 2020-02-27 RX ORDER — CARVEDILOL 25 MG/1
TABLET ORAL
Qty: 180 TAB | Refills: 3 | Status: SHIPPED | OUTPATIENT
Start: 2020-02-27 | End: 2021-02-15

## 2020-02-27 NOTE — TELEPHONE ENCOUNTER
Per VO of Dr. Mendez Mutters: 10/31/2019    Future Appointments   Date Time Provider Shannon Hancock   11/12/2020  2:20 PM Kaleb Bland MD [facility]       Requested Prescriptions     Signed Prescriptions Disp Refills    carvediloL (COREG) 25 mg tablet 180 Tab 3     Sig: TAKE 1 TABLET BY MOUTH TWICE A DAY     Authorizing Provider: Frida Jewell     Ordering User: Yolanda Suresh

## 2020-11-12 ENCOUNTER — OFFICE VISIT (OUTPATIENT)
Dept: CARDIOLOGY CLINIC | Age: 85
End: 2020-11-12
Payer: MEDICARE

## 2020-11-12 VITALS
HEIGHT: 62 IN | HEART RATE: 85 BPM | WEIGHT: 237 LBS | BODY MASS INDEX: 43.61 KG/M2 | SYSTOLIC BLOOD PRESSURE: 158 MMHG | DIASTOLIC BLOOD PRESSURE: 98 MMHG | OXYGEN SATURATION: 95 %

## 2020-11-12 DIAGNOSIS — I10 ESSENTIAL HYPERTENSION: ICD-10-CM

## 2020-11-12 DIAGNOSIS — R06.02 SOB (SHORTNESS OF BREATH): Primary | ICD-10-CM

## 2020-11-12 DIAGNOSIS — E66.01 OBESITY, MORBID (HCC): ICD-10-CM

## 2020-11-12 PROCEDURE — G8417 CALC BMI ABV UP PARAM F/U: HCPCS | Performed by: SPECIALIST

## 2020-11-12 PROCEDURE — G8510 SCR DEP NEG, NO PLAN REQD: HCPCS | Performed by: SPECIALIST

## 2020-11-12 PROCEDURE — G8536 NO DOC ELDER MAL SCRN: HCPCS | Performed by: SPECIALIST

## 2020-11-12 PROCEDURE — G8427 DOCREV CUR MEDS BY ELIG CLIN: HCPCS | Performed by: SPECIALIST

## 2020-11-12 PROCEDURE — G8753 SYS BP > OR = 140: HCPCS | Performed by: SPECIALIST

## 2020-11-12 PROCEDURE — 99214 OFFICE O/P EST MOD 30 MIN: CPT | Performed by: SPECIALIST

## 2020-11-12 PROCEDURE — 93005 ELECTROCARDIOGRAM TRACING: CPT | Performed by: SPECIALIST

## 2020-11-12 PROCEDURE — 1101F PT FALLS ASSESS-DOCD LE1/YR: CPT | Performed by: SPECIALIST

## 2020-11-12 PROCEDURE — G8400 PT W/DXA NO RESULTS DOC: HCPCS | Performed by: SPECIALIST

## 2020-11-12 PROCEDURE — G0463 HOSPITAL OUTPT CLINIC VISIT: HCPCS | Performed by: SPECIALIST

## 2020-11-12 PROCEDURE — 93010 ELECTROCARDIOGRAM REPORT: CPT | Performed by: SPECIALIST

## 2020-11-12 PROCEDURE — 1090F PRES/ABSN URINE INCON ASSESS: CPT | Performed by: SPECIALIST

## 2020-11-12 PROCEDURE — G8755 DIAS BP > OR = 90: HCPCS | Performed by: SPECIALIST

## 2020-11-12 RX ORDER — FUROSEMIDE 40 MG/1
40 TABLET ORAL DAILY
Qty: 30 TAB | Refills: 12 | Status: SHIPPED | OUTPATIENT
Start: 2020-11-12 | End: 2020-12-02

## 2020-11-12 NOTE — PROGRESS NOTES
Chief Complaint   Patient presents with    Annual Exam    Hypertension     Atherosclerosis     Visit Vitals  BP (!) 158/98 (BP 1 Location: Left arm, BP Patient Position: Sitting)   Pulse 85   Ht 5' 2\" (1.575 m)   Wt 237 lb (107.5 kg)   SpO2 95%   BMI 43.35 kg/m²     Chest pain denied   SOB more recently; showering gets her worn out.   Fatigue  Palpitations denied   Swelling in hands/feet pitting x1 bilat feet.  > last thursday  Dizziness denied   Recent hospital stays denied   Refills denied

## 2020-11-12 NOTE — PROGRESS NOTES
Janice Chaves MD. [facility] [place]              Patient: Shanae Mora  : 1935      Today's Date: 2020          HISTORY OF PRESENT ILLNESS:     History of Present Illness:  More SOB lately and getting more tired - gradually got worse. More leg swelling (noticed it last week). Has some chest tightness but no change from prior. Gets SOB just taking a shower. Sleeps on a wedge, but denies orthopnea. PAST MEDICAL HISTORY:     Past Medical History:   Diagnosis Date    Back pain     Chronic kidney disease     Coronary atherosclerosis of native coronary artery     100% LAD stenosis initially medically managed (had seen cardiac surgery). On 13 underwent PCI of  with EZRA    Dyslipidemia     Hematoma     L and R hematoma post cath 13 requiring 5 units PRBC's    IBS (irritable bowel syndrome)     Insomnia     sleep study  unremarkable    Myalgia     had muscle pain on statins    Obesity     Parathyroid adenoma     surgery 2014    Spinal stenosis     surgery     TIA (transient ischemic attack)     possible TIA 3/14, although workup normal    Unspecified essential hypertension          Past Surgical History:   Procedure Laterality Date    CARDIAC SURG PROCEDURE UNLIST       LAD     HX HYSTERECTOMY      HX LUMBAR FUSION  2013    L4-5    HX TONSILLECTOMY             MEDICATIONS:     Current Outpatient Medications   Medication Sig Dispense Refill    carvediloL (COREG) 25 mg tablet TAKE 1 TABLET BY MOUTH TWICE A  Tab 3    fluvastatin (LESCOL) 20 mg capsule TAKE ONE CAPSULE BY MOUTH EVERY DAY 90 Cap 3    isosorbide mononitrate ER (IMDUR) 30 mg tablet TAKE 1 TABLET BY MOUTH EVERY DAY 90 Tab 3    mag carb/aluminum hydrox/algin (GAVISCON EXTRA STRENGTH PO) Take 2 Tabs by mouth as needed.  co-enzyme Q-10 (CO Q-10) 100 mg capsule Take 200 mg by mouth two (2) times a day.       aspirin delayed-release 81 mg tablet Take 1 Tab by mouth daily. 30 Tab 0    fluticasone (FLONASE) 50 mcg/actuation nasal spray 2 Sprays by Both Nostrils route daily.  ULORIC 40 mg tab tablet Take 40 mg by mouth daily.  acetaminophen (TYLENOL ARTHRITIS PAIN) 650 mg CR tablet Take 650 mg by mouth every eight (8) hours as needed for Pain.  loperamide (IMODIUM) 2 mg capsule Take 2 mg by mouth as needed for Diarrhea.  sodium bicarbonate 325 mg tablet Take two tablets in the morning and one tablet in the evening.  triamcinolone acetonide (KENALOG) 0.1 % topical cream Apply  to affected area two (2) times daily as needed. use thin layer      L GASSERI/B BIFIDUM/B LONGUM ([place] PO) Take 1 Cap by mouth daily.  therapeutic multivitamin (THERAGRAN) tablet Take 1 Tab by mouth daily.  ferrous sulfate (SLOW FE) 142 mg (45 mg iron) ER tablet Take 142 mg by mouth two (2) times a day.  ezetimibe (ZETIA) 10 mg tablet Take 10 mg by mouth nightly.  cholecalciferol, vitamin D3, (VITAMIN D3) 2,000 unit Tab Take 2,000 Units by mouth two (2) times a day. Allergies   Allergen Reactions    Aloe Vera Rash    Hay Fever And Allergy Relief Runny Nose and Cough    Sulfa (Sulfonamide Antibiotics) Rash and Other (comments)     Upset stomach            SOCIAL HISTORY:     Social History     Tobacco Use    Smoking status: Never Smoker    Smokeless tobacco: Never Used   Substance Use Topics    Alcohol use: Yes     Alcohol/week: 2.0 standard drinks     Types: 2 Glasses of wine per week    Drug use: No         FAMILY HISTORY:     Family History   Problem Relation Age of Onset    Heart Disease Mother     Stroke Father             REVIEW OF SYMPTOMS:       Review of Symptoms:  Constitutional: Negative for fever, chills  HEENT: Negative for nosebleeds, tinnitus, and vision changes. Respiratory: Negative for cough, wheezing  Cardiovascular: Negative for syncope, and PND.    Gastrointestinal: Negative for melena.  + IBS  Genitourinary: Negative for dysuria  Musculoskeletal: Negative for myalgias. + joint pain   Skin: + psoriasis   Heme: No problems bleeding. Neurological: Negative for speech change and focal weakness.                   PHYSICAL EXAM:       Physical Exam:  Visit Vitals  BP (!) 158/98 (BP 1 Location: Left arm, BP Patient Position: Sitting)   Pulse 85   Ht 5' 2\" (1.575 m)   Wt 237 lb (107.5 kg)   SpO2 95%   BMI 43.35 kg/m²          Patient appears generally well, mood and affect are appropriate and pleasant. HEENT: Hearing intact, non-icteric, normocephalic, atraumatic. Neck Exam: Supple, no bruits or JVD  Lung Exam: Clear to auscultation, even breath sounds. Cardiac Exam: Regular rate and rhythm with no murmur  Abdomen: Soft, non-tender, normal bowel sounds. - obese   Extremities: Moves all ext well. 1-2+ bilat lower extremity edema. Psych: Appropriate affect  Neuro - Grossly intact                  LABS / OTHER STUDIES:            Labs 3/17 - CMP OK, chol 147, LDL 59, , HDL 49  Labs 9/4/18 - chol 148, , LDL 58, HDL 47, Hgb 10.8, plt 188            CARDIAC DIAGNOSTICS:             Cardiac Evaluation Includes:    1. Echo, December 2, 2009: Normal left ventricular size and function, EF 55% to 70%, grade I diastolic dysfunction. Minimal aortic valve sclerosis with trace aortic insufficiency. 2. Cardiac cath, December 11, 2009: Significant one-vessel coronary artery disease. There is a 90% proximal LAD lesion over a long segment followed by a total occlusion in the mid LAD. The distal LAD is small to moderate sized and fills with left-to-left collaterals. There is mild disease elsewhere. LVEDP was 16, LVEF 60%. 3. Lexiscan Myoview 2/10: small, partially reversible apical defect; LVEF 80%    4. Lexiscan Cardiolite 6/24/13 - normal MPPI, LVEF 59%    5. Cath 7/9/13 - 100% pLAD stenosis, LVEF 65%    6. Cath 7/25/13 - EZRA to LAD  7. Holter 2/12/15 - Normal  8. Echo 12/6/15 - LVEF 60 % to 65 %.  Grade 1 diastolic dysfunction.  Mild LAE  9. CATH: 12/7/2015: L Main: Nml, LAD: Stent patent; dLAD: small - patent/diffuse dz; o/pD1/D2 mild dz, LCflex: Med to large; MLI; OM1 and OM2 - MLI; RCA: Large; pRCA: 80%;PLB and PDA - med; PDA - prox diffuse 40%, LVEDP: 18, LVEF: 55%  ---- s/p EZRA (3 x 23) -> pRCA            EKG 8/14 - NSR, 1st degree AVB  EKG 2/12/15 - NSR, PRWP  EKG 11/25/15 - NSR, PRWP  EKG 10/5/17 - NSR, PRWP  EKG 10/18/18 - NSR, PRWP, LAD  EKG 10/31/19 - NSR, PRWP  EKG 11/12/20 - NSR, 1st degree AVB, PRWP             ASSESSMENT AND PLAN:         Assessment and Plan:   1) CAD and chest pain  - Cardiac Cath Dec 2009 - Significant 1 vessel CAD - 90% proximal LAD lesion followed by total mid-LAD lesion with distal LAD filling by left to left collatorals and right to distal left collaterals.    - She was managed medically for years (after seeing cardiac surgery).    - Due to worsening symptoms she proceeded to PCI of  on 7/25/13 by Dr. Radha Plummer. Her breathing improved s/p PCI.    - She was pain free after PCI in 2013 until about November 1, 2015. Since then she's had some typical chest pain. She also has indigestion which is different.  She was eventually admitted for chest pain and had a cath.    - CATH: 12/7/2015: L Main: Nml, LAD: Stent patent; dLAD: small - patent/diffuse dz; o/pD1/D2 mild dz, LCflex: Med to large; MLI; OM1 and OM2 - MLI; RCA: Large; pRCA: 80%;PLB and PDA - med; PDA - prox diffuse 40%, LVEDP: 18, LVEF: 55%  ---- s/p EZRA (3 x 23) -> pRCA    - She was admitted to [address] [place] [address] in June 2016 - workup there OK per patient   - No further chest pain that she had before  - On 11/12/20 - Does have worsening FRANKLIN past year - recheck an echo to start. Will try low dose lasix 40 mg once daily -- recheck labs a week later. See Trenton Doll in a couple of weeks to go over results and manage further. Suspect some diastolic HF. Can consider CAD evaluation if LVEF has dropped.     - cont ASA, statin, BB, Imdur       2) HTN    - BP high when she first wakes up and then stays around 120/70 or so  - continue medications       3) Heart racing  - continue beta-blockers  - Holter 2/15 was normal    - She is doing better lately       4) Stage 3 CKD   - followed by Dr. Susan Navarro   - will get labs to review       5) Dyslipidemia   - She is tolerating fluvastatin (could not tolerate other statins) and Zetia.    - Prior lipids look good - followed by PCP.        6) See carmen in a week with an echo   Patient expressed understanding of the plan - questions were answered. She has 2 daughters - living with the youngest.   Has 3 great grandkids. [address] Govind Garcia, MD, [address], [address] [id number] [facility]  [address]  [place] bar[place]osa , [address] [place] [address]  Ph: [phone]                                [phone]

## 2020-12-02 ENCOUNTER — OFFICE VISIT (OUTPATIENT)
Dept: CARDIOLOGY CLINIC | Age: 85
End: 2020-12-02
Payer: MEDICARE

## 2020-12-02 ENCOUNTER — ANCILLARY PROCEDURE (OUTPATIENT)
Dept: CARDIOLOGY CLINIC | Age: 85
End: 2020-12-02
Payer: MEDICARE

## 2020-12-02 VITALS
HEIGHT: 62 IN | WEIGHT: 237 LBS | DIASTOLIC BLOOD PRESSURE: 82 MMHG | SYSTOLIC BLOOD PRESSURE: 152 MMHG | BODY MASS INDEX: 43.61 KG/M2

## 2020-12-02 VITALS
WEIGHT: 232 LBS | HEIGHT: 62 IN | DIASTOLIC BLOOD PRESSURE: 82 MMHG | SYSTOLIC BLOOD PRESSURE: 152 MMHG | HEART RATE: 71 BPM | BODY MASS INDEX: 42.69 KG/M2

## 2020-12-02 DIAGNOSIS — R06.02 SOB (SHORTNESS OF BREATH): ICD-10-CM

## 2020-12-02 DIAGNOSIS — E78.5 DYSLIPIDEMIA: ICD-10-CM

## 2020-12-02 DIAGNOSIS — I10 ESSENTIAL HYPERTENSION: Primary | ICD-10-CM

## 2020-12-02 DIAGNOSIS — I25.10 ATHEROSCLEROSIS OF NATIVE CORONARY ARTERY OF NATIVE HEART WITHOUT ANGINA PECTORIS: ICD-10-CM

## 2020-12-02 DIAGNOSIS — E66.01 OBESITY, MORBID (HCC): ICD-10-CM

## 2020-12-02 LAB
ECHO AO ASC DIAM: 3.59 CM
ECHO AO ROOT DIAM: 3.54 CM
ECHO AV AREA PEAK VELOCITY: 2.9 CM2
ECHO AV AREA VTI: 2.8 CM2
ECHO AV AREA/BSA PEAK VELOCITY: 1.4 CM2/M2
ECHO AV AREA/BSA VTI: 1.4 CM2/M2
ECHO AV MEAN GRADIENT: 3.17 MMHG
ECHO AV PEAK GRADIENT: 6.21 MMHG
ECHO AV PEAK VELOCITY: 124.57 CM/S
ECHO AV VTI: 25.2 CM
ECHO EST RA PRESSURE: 8 MMHG
ECHO IVC PROX: 2.03 CM
ECHO LA AREA 4C: 23.08 CM2
ECHO LA MAJOR AXIS: 4.07 CM
ECHO LA MINOR AXIS: 1.98 CM
ECHO LA VOL 2C: 63.56 ML (ref 22–52)
ECHO LA VOL 4C: 69.39 ML (ref 22–52)
ECHO LA VOL BP: 72.92 ML (ref 22–52)
ECHO LA VOL/BSA BIPLANE: 35.49 ML/M2 (ref 16–28)
ECHO LA VOLUME INDEX A2C: 30.94 ML/M2 (ref 16–28)
ECHO LA VOLUME INDEX A4C: 33.77 ML/M2 (ref 16–28)
ECHO LV E' LATERAL VELOCITY: 5.59 CM/S
ECHO LV E' SEPTAL VELOCITY: 4.98 CM/S
ECHO LV INTERNAL DIMENSION DIASTOLIC: 4.85 CM (ref 3.9–5.3)
ECHO LV INTERNAL DIMENSION SYSTOLIC: 3.28 CM
ECHO LV IVSD: 1.09 CM (ref 0.6–0.9)
ECHO LV MASS 2D: 197.2 G (ref 67–162)
ECHO LV MASS INDEX 2D: 96 G/M2 (ref 43–95)
ECHO LV POSTERIOR WALL DIASTOLIC: 1.11 CM (ref 0.6–0.9)
ECHO LVOT DIAM: 2.1 CM
ECHO LVOT PEAK GRADIENT: 4.38 MMHG
ECHO LVOT PEAK VELOCITY: 104.68 CM/S
ECHO LVOT SV: 70.5 ML
ECHO LVOT VTI: 20.4 CM
ECHO MV A VELOCITY: 92.03 CM/S
ECHO MV E DECELERATION TIME (DT): 272.01 MS
ECHO MV E VELOCITY: 50.97 CM/S
ECHO MV E/A RATIO: 0.55
ECHO MV E/E' LATERAL: 9.12
ECHO MV E/E' RATIO (AVERAGED): 9.68
ECHO MV E/E' SEPTAL: 10.23
ECHO MV MAX VELOCITY: 92.53 CM/S
ECHO MV MEAN GRADIENT: 1.07 MMHG
ECHO MV PEAK GRADIENT: 3.42 MMHG
ECHO MV PRESSURE HALF TIME (PHT): 78.88 MS
ECHO MV VTI: 17.13 CM
ECHO RIGHT VENTRICULAR SYSTOLIC PRESSURE (RVSP): 27 MMHG
ECHO RV TAPSE: 2.01 CM (ref 1.5–2)
ECHO TV REGURGITANT MAX VELOCITY: 215.54 CM/S
ECHO TV REGURGITANT PEAK GRADIENT: 18.58 MMHG

## 2020-12-02 PROCEDURE — 93306 TTE W/DOPPLER COMPLETE: CPT | Performed by: SPECIALIST

## 2020-12-02 PROCEDURE — 99214 OFFICE O/P EST MOD 30 MIN: CPT | Performed by: NURSE PRACTITIONER

## 2020-12-02 PROCEDURE — G0463 HOSPITAL OUTPT CLINIC VISIT: HCPCS | Performed by: NURSE PRACTITIONER

## 2020-12-02 RX ORDER — SPIRONOLACTONE 25 MG/1
12.5 TABLET ORAL DAILY
Qty: 15 TAB | Refills: 0 | Status: SHIPPED | OUTPATIENT
Start: 2020-12-02 | End: 2020-12-14

## 2020-12-02 NOTE — PATIENT INSTRUCTIONS
Start spironolactone 12.5mg per day  Take lasix 40mg per day x7 days; See me by VV in 1 week - pending echo results we may discuss stress testing.

## 2020-12-02 NOTE — PROGRESS NOTES
Sekou Olivo, TITA  [id number], Jr Garcia  [place], [address] [facility]    Office [phone]  Fax [phone]      Patient: Ulysses Grijalva  : 1935      Today's Date: 2020      HISTORY OF PRESENT ILLNESS:     History of Present Illness:  Here for fu of dyspnea. Still SOB. Started lasix but only took x1 day as LE swelling improved. States renal doctor dc'd lasix years ago due to CKD. She was scared to take. Can only walk about 15ft before stopping to rest; prev walking 30-45 ft. Sleeps on a wedge, but denies orthopnea or PND  Has mild LE swelling which pt feels is baseline. Denies CP. Has some heartburn but states this is different from her cardiac pain in the past.        Hm BP 130s/70s. PAST MEDICAL HISTORY:     Past Medical History:   Diagnosis Date    Back pain     Chronic kidney disease     Coronary atherosclerosis of native coronary artery     100% LAD stenosis initially medically managed (had seen cardiac surgery). On 13 underwent PCI of  with EZRA    Dyslipidemia     Hematoma     L and R hematoma post cath 13 requiring 5 units PRBC's    IBS (irritable bowel syndrome)     Insomnia     sleep study  unremarkable    Myalgia     had muscle pain on statins    Obesity     Parathyroid adenoma     surgery 2014    Spinal stenosis     surgery     TIA (transient ischemic attack)     possible TIA 3/14, although workup normal    Unspecified essential hypertension          Past Surgical History:   Procedure Laterality Date    CARDIAC SURG PROCEDURE UNLIST       LAD     HX HYSTERECTOMY      HX LUMBAR FUSION  2013    L4-5    HX TONSILLECTOMY             MEDICATIONS:     Current Outpatient Medications   Medication Sig Dispense Refill    spironolactone (ALDACTONE) 25 mg tablet Take 0.5 Tabs by mouth daily.  15 Tab 0    carvediloL (COREG) 25 mg tablet TAKE 1 TABLET BY MOUTH TWICE A  Tab 3    fluvastatin (LESCOL) 20 mg capsule TAKE ONE CAPSULE BY MOUTH EVERY DAY 90 Cap 3    isosorbide mononitrate ER (IMDUR) 30 mg tablet TAKE 1 TABLET BY MOUTH EVERY DAY 90 Tab 3    mag carb/aluminum hydrox/algin (GAVISCON EXTRA STRENGTH PO) Take 2 Tabs by mouth as needed.  co-enzyme Q-10 (CO Q-10) 100 mg capsule Take 200 mg by mouth two (2) times a day.  aspirin delayed-release 81 mg tablet Take 1 Tab by mouth daily. 30 Tab 0    fluticasone (FLONASE) 50 mcg/actuation nasal spray 2 Sprays by Both Nostrils route daily.  ULORIC 40 mg tab tablet Take 40 mg by mouth daily.  acetaminophen (TYLENOL ARTHRITIS PAIN) 650 mg CR tablet Take 650 mg by mouth every eight (8) hours as needed for Pain.  loperamide (IMODIUM) 2 mg capsule Take 2 mg by mouth as needed for Diarrhea.  sodium bicarbonate 325 mg tablet Take two tablets in the morning and one tablet in the evening.  triamcinolone acetonide (KENALOG) 0.1 % topical cream Apply  to affected area two (2) times daily as needed. use thin layer      L GASSERI/B BIFIDUM/B LONGUM ([employer] PO) Take 1 Cap by mouth daily.  therapeutic multivitamin (THERAGRAN) tablet Take 1 Tab by mouth daily.  ferrous sulfate (SLOW FE) 142 mg (45 mg iron) ER tablet Take 142 mg by mouth daily.  ezetimibe (ZETIA) 10 mg tablet Take 10 mg by mouth nightly.  cholecalciferol, vitamin D3, (VITAMIN D3) 2,000 unit Tab Take 2,000 Units by mouth two (2) times a day. Allergies   Allergen Reactions    Aloe Vera Rash    Hay Fever And Allergy Relief Runny Nose and Cough    Sulfa (Sulfonamide Antibiotics) Rash and Other (comments)     Upset stomach            SOCIAL HISTORY:     Social History     Tobacco Use    Smoking status: Never Smoker    Smokeless tobacco: Never Used   Substance Use Topics    Alcohol use:  Yes     Alcohol/week: 2.0 standard drinks     Types: 2 Glasses of wine per week    Drug use: No         FAMILY HISTORY:     Family History   Problem Relation Age of Onset    Heart Disease Mother     Stroke Father             REVIEW OF SYMPTOMS:       Review of Symptoms:  Constitutional: Negative for fever, chills  HEENT: Negative for nosebleeds, tinnitus, and vision changes. Respiratory: Negative for cough, wheezing  Cardiovascular: Negative for syncope, and PND. Gastrointestinal: Negative for melena.  + IBS  Genitourinary: Negative for dysuria  Musculoskeletal: Negative for myalgias. + joint pain   Skin: + psoriasis   Heme: No problems bleeding. Neurological: Negative for speech change and focal weakness.                   PHYSICAL EXAM:       Physical Exam:  Visit Vitals  BP (!) 152/82   Pulse 71   Ht 5' 2\" (1.575 m)   Wt 232 lb (105.2 kg)   BMI 42.43 kg/m²     Repeat /80    BP Readings from Last 3 Encounters:   12/02/20 (!) 152/82   12/02/20 (!) 152/82   11/12/20 (!) 158/98      Patient appears generally well, mood and affect are appropriate and pleasant. HEENT: Hearing intact, non-icteric, normocephalic, atraumatic. Neck Exam: Supple, no bruits or JVD  Lung Exam: Clear to auscultation, even breath sounds. Cardiac Exam: Regular rate and rhythm with no murmur  Abdomen: Soft, non-tender, normal bowel sounds. - obese   Extremities: Moves all ext well. 1+ bilat lower extremity edema. Psych: Appropriate affect  Neuro - Grossly intact          LABS / OTHER STUDIES:            Labs 3/17 - CMP OK, chol 147, LDL 59, , HDL 49  Labs 9/4/18 - chol 148, , LDL 58, HDL 47, Hgb 10.8, plt 188  Labs 11/30/20 - BUN 20, Cr 1.09, Na 140, K 4.8, Hgb 11.4         CARDIAC DIAGNOSTICS:             Cardiac Evaluation Includes:    1. Echo, December 2, 2009: Normal left ventricular size and function, EF 55% to 20%, grade I diastolic dysfunction. Minimal aortic valve sclerosis with trace aortic insufficiency. 2. Cardiac cath, December 11, 2009: Significant one-vessel coronary artery disease.  There is a 90% proximal LAD lesion over a long segment followed by a total occlusion in the mid LAD. The distal LAD is small to moderate sized and fills with left-to-left collaterals. There is mild disease elsewhere. LVEDP was 16, LVEF 60%. 3. Lexiscan Myoview 2/10: small, partially reversible apical defect; LVEF 80%    4. Lexiscan Cardiolite 6/24/13 - normal MPPI, LVEF 59%    5. Cath 7/9/13 - 100% pLAD stenosis, LVEF 65%    6. Cath 7/25/13 - EZRA to LAD  7. Holter 2/12/15 - Normal  8. Echo 12/6/15 - LVEF 60 % to 65 %. Grade 1 diastolic dysfunction.  Mild LAE  9. CATH: 12/7/2015: L Main: Nml, LAD: Stent patent; dLAD: small - patent/diffuse dz; o/pD1/D2 mild dz, LCflex: Med to large; MLI; OM1 and OM2 - MLI; RCA: Large; pRCA: 80%;PLB and PDA - med; PDA - prox diffuse 40%, LVEDP: 18, LVEF: 55%  ---- s/p EZRA (3 x 23) -> pRCA            EKG 8/14 - NSR, 1st degree AVB  EKG 2/12/15 - NSR, PRWP  EKG 11/25/15 - NSR, PRWP  EKG 10/5/17 - NSR, PRWP  EKG 10/18/18 - NSR, PRWP, LAD  EKG 10/31/19 - NSR, PRWP  EKG 11/12/20 - NSR, 1st degree AVB, PRWP             ASSESSMENT AND PLAN:         Assessment and Plan:   1) CAD and chest pain  - Cardiac Cath Dec 2009 - Significant 1 vessel CAD - 90% proximal LAD lesion followed by total mid-LAD lesion with distal LAD filling by left to left collatorals and right to distal left collaterals.    - She was managed medically for years (after seeing cardiac surgery).    - Due to worsening symptoms she proceeded to PCI of  on 7/25/13 by Dr. Alberto Ramirez. Her breathing improved s/p PCI.    - She was pain free after PCI in 2013 until about November 1, 2015. Since then she's had some typical chest pain.  She also has indigestion which is different.  She was eventually admitted for chest pain and had a cath.    - CATH: 12/7/2015: L Main: Nml, LAD: Stent patent; dLAD: small - patent/diffuse dz; o/pD1/D2 mild dz, LCflex: Med to large; MLI; OM1 and OM2 - MLI; RCA: Large; pRCA: 80%;PLB and PDA - med; PDA - prox diffuse 40%, LVEDP: 18, LVEF: 55%  ---- s/p EZRA (3 x 23) -> pRCA    - She was admitted to [address] [place] [address] in June 2016 - workup there OK per patient   - No further chest pain that she had before  - On 11/12/20 - Does have worsening FRANKLIN past year - recheck an echo to start  - 12/2/20 - Echo today (results pending). Again advised to try lasix 40mg/d x1 week; pending response consider Merle stress - cont ASA, statin, BB, Imdur       2) HTN    - BP consistently elevated in office - 130s/70s at home  - suspect component of diastolic dysfx - start chnace 12.5mg/d  - continue other BP medications       3) Heart racing  - continue beta-blockers  - Holter 2/15 was normal    - She is doing better lately       4) CKD   - followed by Dr. Mary Ewing, recent labs with Cr 1.09 / Bela Sidra 20 / eGFR non AA 55      5) Dyslipidemia   - She is tolerating fluvastatin (could not tolerate other statins) and Zetia.    - Prior lipids look good - followed by PCP.        6) f/u again in 7-10d by VV; repeat BMP in 2 wks  Patient expressed understanding of the plan - questions were answered. She has 2 daughters - living with the youngest.   Has 3 great grandkids.            Danny Dawkins, ANP

## 2020-12-02 NOTE — PROGRESS NOTES
Visit Vitals  BP (!) 152/82   Pulse 71   Ht 5' 2\" (1.575 m)   Wt 232 lb (105.2 kg)   BMI 42.43 kg/m²

## 2020-12-02 NOTE — LETTER
12/2/20 Patient: Carlo Carney YOB: 1935 Date of Visit: 12/2/2020 Marcin Lyons MD 
[address] [address] VIA Facsimile: [phone] Dear Marcin Lyons MD, Thank you for referring Ms. Greg Garrett to [facility] for evaluation. My notes for this consultation are attached. If you have questions, please do not hesitate to call me. I look forward to following your patient along with you.  
 
 
Sincerely, 
 
Stacie Jade NP

## 2020-12-07 ENCOUNTER — TELEPHONE (OUTPATIENT)
Dept: CARDIOLOGY CLINIC | Age: 85
End: 2020-12-07

## 2020-12-07 NOTE — TELEPHONE ENCOUNTER
Called pt - reviewed echo results. Breathing starting to improve. Now able to walk to restroom. 12/02/20   ECHO ADULT COMPLETE 12/02/2020 12/2/2020    Narrative · LV: Calculated LVEF is 60%. Visually measured ejection fraction. Normal   cavity size and systolic function (ejection fraction normal). Mild   concentric hypertrophy. Mild (grade 1) left ventricular diastolic   dysfunction. · LA: Moderately dilated left atrium. · AO: Mild ascending aorta dilatation. Ascending aorta diameter = 3.6 cm.         Signed by: Lyndon Wilburn MD

## 2020-12-13 LAB
BUN SERPL-MCNC: 41 MG/DL (ref 8–27)
BUN/CREAT SERPL: 25 (ref 12–28)
CALCIUM SERPL-MCNC: 10.9 MG/DL (ref 8.7–10.3)
CHLORIDE SERPL-SCNC: 102 MMOL/L (ref 96–106)
CO2 SERPL-SCNC: 22 MMOL/L (ref 20–29)
CREAT SERPL-MCNC: 1.66 MG/DL (ref 0.57–1)
GLUCOSE SERPL-MCNC: 97 MG/DL (ref 65–99)
INTERPRETATION: NORMAL
POTASSIUM SERPL-SCNC: 4.6 MMOL/L (ref 3.5–5.2)
SODIUM SERPL-SCNC: 141 MMOL/L (ref 134–144)

## 2020-12-14 ENCOUNTER — VIRTUAL VISIT (OUTPATIENT)
Dept: CARDIOLOGY CLINIC | Age: 85
End: 2020-12-14
Payer: MEDICARE

## 2020-12-14 DIAGNOSIS — I10 ESSENTIAL HYPERTENSION: ICD-10-CM

## 2020-12-14 DIAGNOSIS — I25.10 ATHEROSCLEROSIS OF NATIVE CORONARY ARTERY OF NATIVE HEART WITHOUT ANGINA PECTORIS: Primary | ICD-10-CM

## 2020-12-14 DIAGNOSIS — E66.01 OBESITY, MORBID (HCC): ICD-10-CM

## 2020-12-14 DIAGNOSIS — E78.5 DYSLIPIDEMIA: ICD-10-CM

## 2020-12-14 PROCEDURE — G8432 DEP SCR NOT DOC, RNG: HCPCS | Performed by: NURSE PRACTITIONER

## 2020-12-14 PROCEDURE — 1101F PT FALLS ASSESS-DOCD LE1/YR: CPT | Performed by: NURSE PRACTITIONER

## 2020-12-14 PROCEDURE — G8427 DOCREV CUR MEDS BY ELIG CLIN: HCPCS | Performed by: NURSE PRACTITIONER

## 2020-12-14 PROCEDURE — G0463 HOSPITAL OUTPT CLINIC VISIT: HCPCS | Performed by: NURSE PRACTITIONER

## 2020-12-14 PROCEDURE — G8400 PT W/DXA NO RESULTS DOC: HCPCS | Performed by: NURSE PRACTITIONER

## 2020-12-14 PROCEDURE — G8756 NO BP MEASURE DOC: HCPCS | Performed by: NURSE PRACTITIONER

## 2020-12-14 PROCEDURE — 1090F PRES/ABSN URINE INCON ASSESS: CPT | Performed by: NURSE PRACTITIONER

## 2020-12-14 PROCEDURE — 99213 OFFICE O/P EST LOW 20 MIN: CPT | Performed by: NURSE PRACTITIONER

## 2020-12-14 NOTE — PROGRESS NOTES
TITA Christina  [id number] [place], [address] [facility]    Office [phone]  Fax [phone]      Patient: Zuleyka Barth  : 1935      Today's Date: 2020       VIRTUAL VISIT DOCUMENTATION     Pursuant to the emergency declaration under the [address] [place] [address] waiver authority and the [employer] Act, this Virtual  Visit was conducted, with patient's consent, to reduce the patient's risk of exposure to COVID-19 and provide continuity of care for an established patient. Services were provided through a video synchronous discussion virtually to substitute for in-person clinic visit. HISTORY OF PRESENT ILLNESS:     History of Present Illness:  Here for fu of dyspnea. Feels breathing is better / now baseline after starting lasix. However, still taking daily lasix as well as spironolactone. Feels weak and nauseated. Hm BP often 100s/50s-60s. No presyncope or syncope. No swelling. No CP. PAST MEDICAL HISTORY:     Past Medical History:   Diagnosis Date    Back pain     Chronic kidney disease     Coronary atherosclerosis of native coronary artery     100% LAD stenosis initially medically managed (had seen cardiac surgery).   On 13 underwent PCI of  with EZRA    Dyslipidemia     Hematoma     L and R hematoma post cath 13 requiring 5 units PRBC's    IBS (irritable bowel syndrome)     Insomnia     sleep study  unremarkable    Myalgia     had muscle pain on statins    Obesity     Parathyroid adenoma     surgery 2014    Spinal stenosis     surgery     TIA (transient ischemic attack)     possible TIA 3/14, although workup normal    Unspecified essential hypertension          Past Surgical History:   Procedure Laterality Date    CARDIAC SURG PROCEDURE UNLIST       LAD     HX HYSTERECTOMY      HX LUMBAR FUSION  2013 L4-5    HX TONSILLECTOMY             MEDICATIONS:     Current Outpatient Medications   Medication Sig Dispense Refill    carvediloL (COREG) 25 mg tablet TAKE 1 TABLET BY MOUTH TWICE A  Tab 3    fluvastatin (LESCOL) 20 mg capsule TAKE ONE CAPSULE BY MOUTH EVERY DAY 90 Cap 3    isosorbide mononitrate ER (IMDUR) 30 mg tablet TAKE 1 TABLET BY MOUTH EVERY DAY 90 Tab 3    mag carb/aluminum hydrox/algin (GAVISCON EXTRA STRENGTH PO) Take 2 Tabs by mouth as needed.  co-enzyme Q-10 (CO Q-10) 100 mg capsule Take 200 mg by mouth two (2) times a day.  aspirin delayed-release 81 mg tablet Take 1 Tab by mouth daily. 30 Tab 0    fluticasone (FLONASE) 50 mcg/actuation nasal spray 2 Sprays by Both Nostrils route daily.  ULORIC 40 mg tab tablet Take 40 mg by mouth daily.  acetaminophen (TYLENOL ARTHRITIS PAIN) 650 mg CR tablet Take 650 mg by mouth every eight (8) hours as needed for Pain.  loperamide (IMODIUM) 2 mg capsule Take 2 mg by mouth as needed for Diarrhea.  sodium bicarbonate 325 mg tablet Take two tablets in the morning and one tablet in the evening.  triamcinolone acetonide (KENALOG) 0.1 % topical cream Apply  to affected area two (2) times daily as needed. use thin layer      L GASSERI/B BIFIDUM/B LONGUM ([employer] PO) Take 1 Cap by mouth daily.  therapeutic multivitamin (THERAGRAN) tablet Take 1 Tab by mouth daily.  ferrous sulfate (SLOW FE) 142 mg (45 mg iron) ER tablet Take 142 mg by mouth daily.  ezetimibe (ZETIA) 10 mg tablet Take 10 mg by mouth nightly.  cholecalciferol, vitamin D3, (VITAMIN D3) 2,000 unit Tab Take 2,000 Units by mouth two (2) times a day.          Allergies   Allergen Reactions    Aloe Vera Rash    Hay Fever And Allergy Relief Runny Nose and Cough    Sulfa (Sulfonamide Antibiotics) Rash and Other (comments)     Upset stomach            SOCIAL HISTORY:     Social History     Tobacco Use    Smoking status: Never Smoker    Smokeless tobacco: Never Used   Substance Use Topics    Alcohol use: Yes     Alcohol/week: 2.0 standard drinks     Types: 2 Glasses of wine per week    Drug use: No         FAMILY HISTORY:     Family History   Problem Relation Age of Onset    Heart Disease Mother     Stroke Father             REVIEW OF SYMPTOMS:       Review of Symptoms:  Constitutional: Negative for fever, chills  HEENT: Negative for nosebleeds, tinnitus, and vision changes. Respiratory: Negative for cough, wheezing  Cardiovascular: Negative for syncope, and PND. Gastrointestinal: Negative for melena.  + IBS  Genitourinary: Negative for dysuria  Musculoskeletal: Negative for myalgias. + joint pain   Skin: + psoriasis   Heme: No problems bleeding. Neurological: Negative for speech change and focal weakness.                   PHYSICAL EXAM:       Due to this being a TeleHealth evaluation, many elements of the physical examination are unable to be assessed. General: Well developed, in no acute distress, cooperative and alert  HEENT: Pupils equal/round. No marked JVD visible on video. Respiratory: No audible wheezing, no signs of respiratory distress, lips non cyanotic  Extremities:  No edema  Neuro: A&Ox3, speech clear, no facial droop, answering questions appropriately  Skin: Skin color is normal. No rashes or lesions.  Non diaphoretic on visible skin during exam          LABS / OTHER STUDIES:            Labs 3/17 - CMP OK, chol 147, LDL 59, , HDL 49  Labs 9/4/18 - chol 148, , LDL 58, HDL 47, Hgb 10.8, plt 188  Labs 11/30/20 - BUN 20, Cr 1.09, Na 140, K 4.8, Hgb 11.4     Lab Results   Component Value Date/Time    Sodium 141 12/12/2020 11:49 AM    Potassium 4.6 12/12/2020 11:49 AM    Chloride 102 12/12/2020 11:49 AM    CO2 22 12/12/2020 11:49 AM    Anion gap 10 12/08/2015 04:08 AM    Glucose 97 12/12/2020 11:49 AM    BUN 41 (H) 12/12/2020 11:49 AM    Creatinine 1.66 (H) 12/12/2020 11:49 AM    BUN/Creatinine ratio 25 12/12/2020 11:49 AM    GFR est AA 32 (L) 12/12/2020 11:49 AM    GFR est non-AA 28 (L) 12/12/2020 11:49 AM    Calcium 10.9 (H) 12/12/2020 11:49 AM    Bilirubin, total 0.4 03/07/2014 02:15 PM    Alk. phosphatase 99 03/07/2014 02:15 PM    Protein, total 7.0 03/07/2014 02:15 PM    Albumin 3.8 03/07/2014 02:15 PM    Globulin 3.2 03/07/2014 02:15 PM    A-G Ratio 1.2 03/07/2014 02:15 PM    ALT (SGPT) 28 03/07/2014 02:15 PM    AST (SGOT) 24 03/07/2014 02:15 PM         CARDIAC DIAGNOSTICS:             Cardiac Evaluation Includes:    1. Echo, December 2, 2009: Normal left ventricular size and function, EF 55% to 88%, grade I diastolic dysfunction. Minimal aortic valve sclerosis with trace aortic insufficiency. 2. Cardiac cath, December 11, 2009: Significant one-vessel coronary artery disease. There is a 90% proximal LAD lesion over a long segment followed by a total occlusion in the mid LAD. The distal LAD is small to moderate sized and fills with left-to-left collaterals. There is mild disease elsewhere. LVEDP was 16, LVEF 60%. 3. Lexiscan Myoview 2/10: small, partially reversible apical defect; LVEF 80%    4. Lexiscan Cardiolite 6/24/13 - normal MPPI, LVEF 59%    5. Cath 7/9/13 - 100% pLAD stenosis, LVEF 65%    6. Cath 7/25/13 - EZRA to LAD  7. Holter 2/12/15 - Normal  8. Echo 12/6/15 - LVEF 60 % to 65 %. Grade 1 diastolic dysfunction.  Mild LAE  9. CATH: 12/7/2015: L Main: Nml, LAD: Stent patent; dLAD: small - patent/diffuse dz; o/pD1/D2 mild dz, LCflex: Med to large; MLI; OM1 and OM2 - MLI; RCA: Large; pRCA: 80%;PLB and PDA - med; PDA - prox diffuse 40%, LVEDP: 18, LVEF: 55%  ---- s/p EZRA (3 x 23) -> pRCA    10. Echo 12/2/20 - LVEF 60%, mild concentri hypertrophy.   G1DD, ascending aorta 3.6cm, mild LAE          EKG 8/14 - NSR, 1st degree AVB  EKG 2/12/15 - NSR, PRWP  EKG 11/25/15 - NSR, PRWP  EKG 10/5/17 - NSR, PRWP  EKG 10/18/18 - NSR, PRWP, LAD  EKG 10/31/19 - NSR, PRWP  EKG 11/12/20 - NSR, 1st degree AVB, PRWP             ASSESSMENT AND PLAN:         Assessment and Plan:   1) CAD and chest pain  - Cardiac Cath Dec 2009 - Significant 1 vessel CAD - 90% proximal LAD lesion followed by total mid-LAD lesion with distal LAD filling by left to left collatorals and right to distal left collaterals.    - She was managed medically for years (after seeing cardiac surgery).    - Due to worsening symptoms she proceeded to PCI of  on 7/25/13 by Dr. Gatito Cespedes. Her breathing improved s/p PCI.    - She was pain free after PCI in 2013 until about November 1, 2015. Since then she's had some typical chest pain. She also has indigestion which is different.  She was eventually admitted for chest pain and had a cath.    - CATH: 12/7/2015: L Main: Nml, LAD: Stent patent; dLAD: small - patent/diffuse dz; o/pD1/D2 mild dz, LCflex: Med to large; MLI; OM1 and OM2 - MLI; RCA: Large; pRCA: 80%;PLB and PDA - med; PDA - prox diffuse 40%, LVEDP: 18, LVEF: 55%  ---- s/p EZRA (3 x 23) -> pRCA    - She was admitted to [address] [place] [address] in June 2016 - workup there OK per patient   - No further chest pain that she had before  - On 11/12/20 - Does have worsening FRANKLIN past year - recheck an echo to start  - 12/2/20 - Echo with nml LVEF; G1DD. Again advised to try lasix 40mg/d x1 week  - 12/14/20 - breathing improved - stop lasix; use only PRN in future; denies CP  - cont ASA, statin, BB, Imdur       2) HTN    - BP now low/nml - stop spironolactone as well as lasix   - cont other meds      3) Heart racing  - continue beta-blockers  - Holter 2/15 was normal    - She is doing better lately       4) CKD   - followed by Dr. Domitila Suazo, recent labs with Cr 1.66 - previously Cr 1.09 / [place] 20 / eGFR non AA 46  - suspect pt now dry - med changes per above       5) Dyslipidemia   - She is tolerating fluvastatin (could not tolerate other statins) and Zetia.    - Prior lipids look good - followed by PCP.        6) f/u in 3mo or PRN  Patient expressed understanding of the plan - questions were answered. She has 2 daughters - living with the youngest.   Has 3 great grandkids.         Greater than 20 minutes was spent in direct video patient care, planning and chart review. This visit was conducted using [employer] telemedicine services.        Kaycee Jackson NP    [address]  [place] [address]        [phone] / [phone] Fax

## 2020-12-23 RX ORDER — ISOSORBIDE MONONITRATE 30 MG/1
TABLET, EXTENDED RELEASE ORAL
Qty: 90 TAB | Refills: 1 | Status: SHIPPED | OUTPATIENT
Start: 2020-12-23 | End: 2021-06-23

## 2020-12-23 NOTE — TELEPHONE ENCOUNTER
Refill per VO of Dr. Agustín Jordan:  Last appt: 12/14/20  Future Appointments   Date Time Provider Shannon Hancock   3/18/2021 11:00 AM Clemente Miranda, MD [facility]       Requested Prescriptions     Signed Prescriptions Disp Refills    isosorbide mononitrate ER (IMDUR) 30 mg tablet 90 Tab 1     Sig: TAKE 1 TABLET BY MOUTH EVERY DAY     Authorizing Provider: Milana Mooney     Ordering User: Luwanna Nissen

## 2021-01-28 RX ORDER — FLUVASTATIN 20 MG/1
CAPSULE ORAL
Qty: 90 CAP | Refills: 0 | Status: SHIPPED | OUTPATIENT
Start: 2021-01-28 | End: 2021-04-28

## 2021-01-28 NOTE — TELEPHONE ENCOUNTER
Refill per VO of Dr. Cheng Money:  Last appt: 12/14/20  Future Appointments   Date Time Provider Shannon Karissa   3/18/2021 11:00 AM Sarah Johnson MD [facility] AMB       Requested Prescriptions     Signed Prescriptions Disp Refills    fluvastatin (LESCOL) 20 mg capsule 90 Cap 0     Sig: TAKE 1 CAPSULE BY MOUTH EVERY DAY     Authorizing Provider: Taras Rendon     Ordering User: Rachell Soni

## 2021-04-28 RX ORDER — FLUVASTATIN 20 MG/1
CAPSULE ORAL
Qty: 90 CAP | Refills: 2 | Status: SHIPPED | OUTPATIENT
Start: 2021-04-28 | End: 2022-01-26

## 2021-04-28 NOTE — TELEPHONE ENCOUNTER
Per Dr. Jennifer Mas VO:  LOV:10/31/2019  No future appointments.   Requested Prescriptions     Pending Prescriptions Disp Refills    fluvastatin (LESCOL) 20 mg capsule [Pharmacy Med Name: FLUVASTATIN SODIUM 20 MG CAP] 90 Cap 0     Sig: TAKE 1 CAPSULE BY MOUTH EVERY DAY

## 2021-05-12 RX ORDER — CARVEDILOL 25 MG/1
TABLET ORAL
Qty: 180 TAB | Refills: 0 | Status: SHIPPED | OUTPATIENT
Start: 2021-05-12 | End: 2021-08-10

## 2021-06-23 RX ORDER — ISOSORBIDE MONONITRATE 30 MG/1
TABLET, EXTENDED RELEASE ORAL
Qty: 90 TABLET | Refills: 1 | Status: SHIPPED | OUTPATIENT
Start: 2021-06-23 | End: 2021-12-14

## 2021-06-23 NOTE — TELEPHONE ENCOUNTER
Per Dr. Queen Headings VO:  LOV:10/31/2019  No future appointments.   Requested Prescriptions     Pending Prescriptions Disp Refills    isosorbide mononitrate ER (IMDUR) 30 mg tablet [Pharmacy Med Name: ISOSORBIDE MONONIT ER 30 MG TB] 90 Tablet 1     Sig: TAKE 1 TABLET BY MOUTH EVERY DAY

## 2021-08-10 RX ORDER — CARVEDILOL 25 MG/1
TABLET ORAL
Qty: 180 TABLET | Refills: 0 | Status: SHIPPED | OUTPATIENT
Start: 2021-08-10 | End: 2021-11-10

## 2021-11-10 RX ORDER — CARVEDILOL 25 MG/1
TABLET ORAL
Qty: 60 TABLET | Refills: 0 | Status: SHIPPED | OUTPATIENT
Start: 2021-11-10 | End: 2021-12-13 | Stop reason: SDUPTHER

## 2021-11-10 NOTE — TELEPHONE ENCOUNTER
Refill per VO of Dr. Cele Garrett  Last appt: Visit date not found  No future appointments.     Requested Prescriptions     Pending Prescriptions Disp Refills    carvediloL (COREG) 25 mg tablet [Pharmacy Med Name: CARVEDILOL 25 MG TABLET] 180 Tablet 0     Sig: TAKE 1 TABLET BY MOUTH TWICE A DAY

## 2021-12-06 RX ORDER — CARVEDILOL 25 MG/1
TABLET ORAL
Qty: 60 TABLET | Refills: 0 | OUTPATIENT
Start: 2021-12-06

## 2021-12-06 NOTE — TELEPHONE ENCOUNTER
Refill per VO of Dr. Oli Cordova  Last appt: Visit date not found  No future appointments.     Requested Prescriptions     Pending Prescriptions Disp Refills    carvediloL (COREG) 25 mg tablet [Pharmacy Med Name: CARVEDILOL 25 MG TABLET] 60 Tablet 0     Sig: TAKE 1 TABLET BY MOUTH TWICE A DAY

## 2021-12-13 RX ORDER — CARVEDILOL 25 MG/1
TABLET ORAL
Qty: 60 TABLET | Refills: 0 | OUTPATIENT
Start: 2021-12-13

## 2021-12-13 RX ORDER — CARVEDILOL 25 MG/1
25 TABLET ORAL 2 TIMES DAILY
Qty: 60 TABLET | Refills: 1 | Status: SHIPPED | OUTPATIENT
Start: 2021-12-13 | End: 2022-01-05

## 2021-12-13 NOTE — TELEPHONE ENCOUNTER
Refill per VO of Dr. Manzano   Last appt: Visit date not found  No future appointments.     Requested Prescriptions     Pending Prescriptions Disp Refills    carvediloL (COREG) 25 mg tablet [Pharmacy Med Name: CARVEDILOL 25 MG TABLET] 60 Tablet 0     Sig: TAKE 1 TABLET BY MOUTH TWICE A DAY     Refill was denied because,  PT NEEDS AN APPOINTMENT

## 2021-12-13 NOTE — TELEPHONE ENCOUNTER
Patient has 30 days left of the Carvedilol medication.        Phone: [phone]      Patient has upcoming appt on 1/20/22 at 3:40p

## 2021-12-13 NOTE — TELEPHONE ENCOUNTER
Refill per VO of Dr. Pat Galvan  Last appt: 12/14/2020  Future Appointments   Date Time Provider Shannon Karissa   1/20/2022  3:40 PM Maxim Aparicio MD [facility]       Requested Prescriptions     Signed Prescriptions Disp Refills    carvediloL (COREG) 25 mg tablet 60 Tablet 1     Sig: Take 1 Tablet by mouth two (2) times a day.      Authorizing Provider: Dustin Martin     Ordering User: Jordyn Freed

## 2021-12-14 RX ORDER — ISOSORBIDE MONONITRATE 30 MG/1
TABLET, EXTENDED RELEASE ORAL
Qty: 60 TABLET | Refills: 0 | Status: SHIPPED | OUTPATIENT
Start: 2021-12-14 | End: 2022-02-10

## 2021-12-14 NOTE — TELEPHONE ENCOUNTER
Refill per VO of Dr. Dariel Johnson  Last appt: Visit date not found  Future Appointments   Date Time Provider Shannon Karissa   1/20/2022  3:40 PM Cori Quigley MD [facility]       Requested Prescriptions     Pending Prescriptions Disp Refills    isosorbide mononitrate ER (IMDUR) 30 mg tablet [Pharmacy Med Name: Marley Single ER 30 MG TB] 90 Tablet 1     Sig: TAKE 1 TABLET BY MOUTH EVERY DAY

## 2022-01-05 RX ORDER — CARVEDILOL 25 MG/1
TABLET ORAL
Qty: 60 TABLET | Refills: 0 | Status: SHIPPED | OUTPATIENT
Start: 2022-01-05 | End: 2022-02-01

## 2022-01-26 ENCOUNTER — OFFICE VISIT (OUTPATIENT)
Dept: CARDIOLOGY CLINIC | Age: 87
End: 2022-01-26
Payer: MEDICARE

## 2022-01-26 VITALS
HEIGHT: 62 IN | OXYGEN SATURATION: 92 % | SYSTOLIC BLOOD PRESSURE: 144 MMHG | HEART RATE: 85 BPM | BODY MASS INDEX: 42.58 KG/M2 | DIASTOLIC BLOOD PRESSURE: 90 MMHG | WEIGHT: 231.4 LBS

## 2022-01-26 DIAGNOSIS — I20.9 ANGINA, CLASS III (HCC): ICD-10-CM

## 2022-01-26 DIAGNOSIS — I10 ESSENTIAL HYPERTENSION: Primary | ICD-10-CM

## 2022-01-26 DIAGNOSIS — R06.02 SOB (SHORTNESS OF BREATH): ICD-10-CM

## 2022-01-26 DIAGNOSIS — I25.118 ATHEROSCLEROSIS OF NATIVE CORONARY ARTERY OF NATIVE HEART WITH STABLE ANGINA PECTORIS (HCC): ICD-10-CM

## 2022-01-26 DIAGNOSIS — R07.9 CHEST PAIN, UNSPECIFIED TYPE: ICD-10-CM

## 2022-01-26 DIAGNOSIS — I25.10 CORONARY ARTERY DISEASE INVOLVING NATIVE HEART WITHOUT ANGINA PECTORIS, UNSPECIFIED VESSEL OR LESION TYPE: ICD-10-CM

## 2022-01-26 PROCEDURE — G8536 NO DOC ELDER MAL SCRN: HCPCS | Performed by: SPECIALIST

## 2022-01-26 PROCEDURE — G8432 DEP SCR NOT DOC, RNG: HCPCS | Performed by: SPECIALIST

## 2022-01-26 PROCEDURE — 93005 ELECTROCARDIOGRAM TRACING: CPT | Performed by: SPECIALIST

## 2022-01-26 PROCEDURE — G8427 DOCREV CUR MEDS BY ELIG CLIN: HCPCS | Performed by: SPECIALIST

## 2022-01-26 PROCEDURE — 93010 ELECTROCARDIOGRAM REPORT: CPT | Performed by: SPECIALIST

## 2022-01-26 PROCEDURE — 99214 OFFICE O/P EST MOD 30 MIN: CPT | Performed by: SPECIALIST

## 2022-01-26 PROCEDURE — 1090F PRES/ABSN URINE INCON ASSESS: CPT | Performed by: SPECIALIST

## 2022-01-26 PROCEDURE — G0463 HOSPITAL OUTPT CLINIC VISIT: HCPCS | Performed by: SPECIALIST

## 2022-01-26 PROCEDURE — 1101F PT FALLS ASSESS-DOCD LE1/YR: CPT | Performed by: SPECIALIST

## 2022-01-26 PROCEDURE — G8417 CALC BMI ABV UP PARAM F/U: HCPCS | Performed by: SPECIALIST

## 2022-01-26 RX ORDER — FLUVASTATIN 20 MG/1
CAPSULE ORAL
Qty: 90 CAPSULE | Refills: 2 | Status: SHIPPED | OUTPATIENT
Start: 2022-01-26 | End: 2022-10-17

## 2022-01-26 RX ORDER — FUROSEMIDE 40 MG/1
40 TABLET ORAL
COMMUNITY

## 2022-01-26 NOTE — TELEPHONE ENCOUNTER
Refill per VO of Dr. Phillip Dejesus  Last appt: Visit date not found  Future Appointments   Date Time Provider Shannon Hancock   1/26/2022  4:00 PM Doron Byrnes MD [facility]       Requested Prescriptions     Pending Prescriptions Disp Refills    fluvastatin (LESCOL) 20 mg capsule [Pharmacy Med Name: FLUVASTATIN SODIUM 20 MG CAP] 90 Capsule 2     Sig: TAKE 1 CAPSULE BY MOUTH EVERY DAY

## 2022-01-26 NOTE — PROGRESS NOTES
Mal Alonzo MD. [facility]              Patient: Theodora Lancaster  : 1935      Today's Date: 2022          HISTORY OF PRESENT ILLNESS:     History of Present Illness:  Has chronic FRANKLIN. Has class 2-3 FRANKLIN -- is able to shower and do things at home OK which is an improvement. PAST MEDICAL HISTORY:     Past Medical History:   Diagnosis Date    Back pain     Chronic kidney disease     Coronary atherosclerosis of native coronary artery     100% LAD stenosis initially medically managed (had seen cardiac surgery). On 13 underwent PCI of  with EZRA    Dyslipidemia     Hematoma     L and R hematoma post cath 13 requiring 5 units PRBC's    IBS (irritable bowel syndrome)     Insomnia     sleep study  unremarkable    Myalgia     had muscle pain on statins    Obesity     Parathyroid adenoma     surgery 2014    Spinal stenosis     surgery     TIA (transient ischemic attack)     possible TIA 3/14, although workup normal    Unspecified essential hypertension          Past Surgical History:   Procedure Laterality Date    HX HYSTERECTOMY      HX LUMBAR FUSION  2013    L4-5    HX TONSILLECTOMY      AR CARDIAC SURG PROCEDURE UNLIST       LAD            MEDICATIONS:     Current Outpatient Medications   Medication Sig Dispense Refill    fluvastatin (LESCOL) 20 mg capsule TAKE 1 CAPSULE BY MOUTH EVERY DAY 90 Capsule 2    furosemide (LASIX) 40 mg tablet Take 40 mg by mouth every Monday, Wednesday, Friday.  carvediloL (COREG) 25 mg tablet TAKE 1 TABLET BY MOUTH TWO TIMES A DAY. 60 Tablet 0    isosorbide mononitrate ER (IMDUR) 30 mg tablet TAKE 1 TABLET BY MOUTH EVERY DAY 60 Tablet 0    mag carb/aluminum hydrox/algin (GAVISCON EXTRA STRENGTH PO) Take 2 Tabs by mouth as needed.  co-enzyme Q-10 (CO Q-10) 100 mg capsule Take 200 mg by mouth two (2) times a day.  aspirin delayed-release 81 mg tablet Take 1 Tab by mouth daily.  30 Tab 0    fluticasone (FLONASE) 50 mcg/actuation nasal spray 2 Sprays by Both Nostrils route daily.  ULORIC 40 mg tab tablet Take 40 mg by mouth daily.  acetaminophen (TYLENOL ARTHRITIS PAIN) 650 mg CR tablet Take 650 mg by mouth every eight (8) hours as needed for Pain.  loperamide (IMODIUM) 2 mg capsule Take 2 mg by mouth as needed for Diarrhea.  sodium bicarbonate 325 mg tablet Take two tablets in the morning and one tablet in the evening.  triamcinolone acetonide (KENALOG) 0.1 % topical cream Apply  to affected area two (2) times daily as needed. use thin layer      L GASSERI/B BIFIDUM/B LONGUM ([employer] PO) Take 1 Cap by mouth daily.  therapeutic multivitamin (THERAGRAN) tablet Take 1 Tab by mouth daily.  ezetimibe (ZETIA) 10 mg tablet Take 10 mg by mouth nightly.  cholecalciferol, vitamin D3, (VITAMIN D3) 2,000 unit Tab Take 2,000 Units by mouth two (2) times a day.  ferrous sulfate (SLOW FE) 142 mg (45 mg iron) ER tablet Take 142 mg by mouth daily. (Patient not taking: Reported on 1/26/2022)         Allergies   Allergen Reactions    Aloe Vera Rash    Hay Fever And Allergy Relief Runny Nose and Cough    Sulfa (Sulfonamide Antibiotics) Rash and Other (comments)     Upset stomach            SOCIAL HISTORY:     Social History     Tobacco Use    Smoking status: Never Smoker    Smokeless tobacco: Never Used   Substance Use Topics    Alcohol use: Yes     Alcohol/week: 2.0 standard drinks     Types: 2 Glasses of wine per week    Drug use: No         FAMILY HISTORY:     Family History   Problem Relation Age of Onset    Heart Disease Mother     Stroke Father             REVIEW OF SYMPTOMS:       Review of Symptoms:  Constitutional: Negative for fever, chills  HEENT: Negative for nosebleeds, tinnitus, and vision changes. Respiratory: Negative for cough, wheezing  Cardiovascular: Negative for syncope, and PND.    Gastrointestinal: Negative for melena.  + IBS  Genitourinary: Negative for dysuria  Musculoskeletal: Negative for myalgias. + joint pain   Skin: + psoriasis   Heme: No problems bleeding. Neurological: Negative for speech change and focal weakness.                   PHYSICAL EXAM:       Physical Exam:  Visit Vitals  BP (!) 144/90 (BP 1 Location: Left upper arm, BP Patient Position: Sitting)   Pulse 85   Ht 5' 2\" (1.575 m)   Wt 231 lb 6.4 oz (105 kg)   SpO2 92%   BMI 42.32 kg/m²          Patient appears generally well, mood and affect are appropriate and pleasant. HEENT: Hearing intact, non-icteric, normocephalic, atraumatic. Neck Exam: Supple, no bruits or JVD  Lung Exam: Clear to auscultation, even breath sounds. Cardiac Exam: Regular rate and rhythm with no murmur  Abdomen: Soft, non-tender, normal bowel sounds. - obese   Extremities: Moves all ext well. 1+ bilat lower extremity edema. Psych: Appropriate affect  Neuro - Grossly intact                  LABS / OTHER STUDIES:            Lab Results   Component Value Date/Time    Sodium 141 12/12/2020 11:49 AM    Potassium 4.6 12/12/2020 11:49 AM    Chloride 102 12/12/2020 11:49 AM    CO2 22 12/12/2020 11:49 AM    Anion gap 10 12/08/2015 04:08 AM    Glucose 97 12/12/2020 11:49 AM    BUN 41 (H) 12/12/2020 11:49 AM    Creatinine 1.66 (H) 12/12/2020 11:49 AM    BUN/Creatinine ratio 25 12/12/2020 11:49 AM    GFR est AA 32 (L) 12/12/2020 11:49 AM    GFR est non-AA 28 (L) 12/12/2020 11:49 AM    Calcium 10.9 (H) 12/12/2020 11:49 AM    Bilirubin, total 0.4 03/07/2014 02:15 PM    Alk.  phosphatase 99 03/07/2014 02:15 PM    Protein, total 7.0 03/07/2014 02:15 PM    Albumin 3.8 03/07/2014 02:15 PM    Globulin 3.2 03/07/2014 02:15 PM    A-G Ratio 1.2 03/07/2014 02:15 PM    ALT (SGPT) 28 03/07/2014 02:15 PM    AST (SGOT) 24 03/07/2014 02:15 PM     Lab Results   Component Value Date/Time    WBC 5.8 12/05/2015 03:00 AM    Hemoglobin (POC) 10.9 (L) 12/04/2015 06:10 PM    HGB 9.3 (L) 12/07/2015 05:45 PM    Hematocrit (POC) 32 (L) 12/04/2015 06:10 PM    HCT 28.4 (L) 12/07/2015 05:45 PM    PLATELET 453 57/24/8836 03:00 AM    MCV 98.7 12/05/2015 03:00 AM                 CARDIAC DIAGNOSTICS:             Cardiac Evaluation Includes:    1. Echo, December 2, 2009: Normal left ventricular size and function, EF 55% to 13%, grade I diastolic dysfunction. Minimal aortic valve sclerosis with trace aortic insufficiency. 2. Cardiac cath, December 11, 2009: Significant one-vessel coronary artery disease. There is a 90% proximal LAD lesion over a long segment followed by a total occlusion in the mid LAD. The distal LAD is small to moderate sized and fills with left-to-left collaterals. There is mild disease elsewhere. LVEDP was 16, LVEF 60%. 3. Lexiscan Myoview 2/10: small, partially reversible apical defect; LVEF 80%    4. Lexiscan Cardiolite 6/24/13 - normal MPPI, LVEF 59%    5. Cath 7/9/13 - 100% pLAD stenosis, LVEF 65%    6. Cath 7/25/13 - EZRA to LAD  7. Holter 2/12/15 - Normal  8. Echo 12/6/15 - LVEF 60 % to 65 %. Grade 1 diastolic dysfunction.  Mild LAE  9. CATH: 12/7/2015: L Main: Nml, LAD: Stent patent; dLAD: small - patent/diffuse dz; o/pD1/D2 mild dz, LCflex: Med to large; MLI; OM1 and OM2 - MLI; RCA: Large; pRCA: 80%;PLB and PDA - med; PDA - prox diffuse 40%, LVEDP: 18, LVEF: 55%  ---- s/p EZRA (3 x 23) -> pRCA        Echo 12/2/20 - LVEF 60%. Grade 1 diastology. Mod LAE.  Asc ao 3.6 cm         EKG 8/14 - NSR, 1st degree AVB  EKG 2/12/15 - NSR, PRWP  EKG 11/25/15 - NSR, PRWP  EKG 10/5/17 - NSR, PRWP  EKG 10/18/18 - NSR, PRWP, LAD  EKG 10/31/19 - NSR, PRWP  EKG 11/12/20 - NSR, 1st degree AVB, PRWP     EKG 1/26/22 - NSR, PRWP, 1st degree AVB          ASSESSMENT AND PLAN:         Assessment and Plan:   1) CAD and chest pain  - Cardiac Cath Dec 2009 - Significant 1 vessel CAD - 90% proximal LAD lesion followed by total mid-LAD lesion with distal LAD filling by left to left collatorals and right to distal left collaterals.    - She was managed medically for years (after seeing cardiac surgery).    - Due to worsening symptoms she proceeded to PCI of  on 7/25/13 by Dr. Hollie Alaniz. Her breathing improved s/p PCI.    - She was pain free after PCI in 2013 until about November 1, 2015. Since then she's had some typical chest pain. She also has indigestion which is different.  She was eventually admitted for chest pain and had a cath.    - CATH: 12/7/2015: L Main: Nml, LAD: Stent patent; dLAD: small - patent/diffuse dz; o/pD1/D2 mild dz, LCflex: Med to large; MLI; OM1 and OM2 - MLI; RCA: Large; pRCA: 80%;PLB and PDA - med; PDA - prox diffuse 40%, LVEDP: 18, LVEF: 55%  ---- s/p EZRA (3 x 23) -> pRCA    - She was admitted to [facility] in June 2016 - workup there OK per patient   - No further chest pain that she had before  - On 1/26/22 - has class 2-3 FRANKLIN, but better than a couple of years ago. Cont lasix every other day. - cont ASA, statin, BB, Imdur       2) HTN    - BP a little high in office but is normal at home --- continue medications and follow BP at home       3) Heart racing  - continue beta-blockers  - Holter 2/15 was normal    - She is doing better lately       4) Stage 3 CKD   - followed by Dr. Alisson Gabriel  5) Dyslipidemia   - She is tolerating fluvastatin (could not tolerate other statins) and Zetia.    - Prior lipids look good - followed by PCP.        6) See me in 6 month. Patient expressed understanding of the plan - questions were answered. She has 2 daughters - living with the youngest.   Has 3 great grandkids. [address] Govind Garcia MD, [address]  [address] [facility], [address]      [address] [facility]  [address]  [facility], [address] [place] [address]  Ph: [phone]                               [phone]

## 2022-01-26 NOTE — PROGRESS NOTES
Chief Complaint   Patient presents with    Follow-up    Hypertension       Visit Vitals  BP (!) 144/90 (BP 1 Location: Left upper arm, BP Patient Position: Sitting)   Pulse 85   Ht 5' 2\" (1.575 m)   Wt 231 lb 6.4 oz (105 kg)   SpO2 92%   BMI 42.32 kg/m²         Chest pain denied  SOB - with activity   Dizziness denied  Swelling/Edema - denied  Recent hospital visit denied

## 2022-02-01 RX ORDER — CARVEDILOL 25 MG/1
TABLET ORAL
Qty: 180 TABLET | Refills: 4 | Status: SHIPPED | OUTPATIENT
Start: 2022-02-01

## 2022-02-01 NOTE — TELEPHONE ENCOUNTER
Refill per VO of Dr. Vasiliy Nichols  Last appt: Visit date not found  Future Appointments   Date Time Provider Shannon Hancock   8/2/2022  1:00 PM OCTAVIA DOMINGUEZ   8/2/2022  2:00 PM MD HAILEY Shields KAYLEIGH AMB       Requested Prescriptions     Pending Prescriptions Disp Refills    carvediloL (COREG) 25 mg tablet [Pharmacy Med Name: CARVEDILOL 25 MG TABLET] 60 Tablet 0     Sig: TAKE 1 TABLET BY MOUTH TWICE A DAY

## 2022-02-02 LAB — CREATININE, EXTERNAL: 1.31

## 2022-02-10 RX ORDER — ISOSORBIDE MONONITRATE 30 MG/1
TABLET, EXTENDED RELEASE ORAL
Qty: 90 TABLET | Refills: 1 | Status: SHIPPED | OUTPATIENT
Start: 2022-02-10 | End: 2022-08-15

## 2022-02-10 NOTE — TELEPHONE ENCOUNTER
Refill per VO of Dr. Rian Oliveira  Last appt: 1/26/22  Future Appointments   Date Time Provider Shannon Hancock   8/2/2022  1:00 PM OCTAVIA DOMINGUEZ   8/2/2022  2:00 PM MD THELMA Jesus BS AMB       Requested Prescriptions     Signed Prescriptions Disp Refills    isosorbide mononitrate ER (IMDUR) 30 mg tablet 90 Tablet 1     Sig: TAKE 1 TABLET BY MOUTH EVERY DAY     Authorizing Provider: Sepideh Green     Ordering User: Renae Hairston

## 2022-03-20 PROBLEM — E66.01 OBESITY, MORBID (HCC): Status: ACTIVE | Noted: 2020-11-12

## 2022-08-08 ENCOUNTER — OFFICE VISIT (OUTPATIENT)
Dept: CARDIOLOGY CLINIC | Age: 87
End: 2022-08-08
Payer: MEDICARE

## 2022-08-08 ENCOUNTER — ANCILLARY PROCEDURE (OUTPATIENT)
Dept: CARDIOLOGY CLINIC | Age: 87
End: 2022-08-08
Payer: MEDICARE

## 2022-08-08 VITALS
WEIGHT: 236 LBS | SYSTOLIC BLOOD PRESSURE: 148 MMHG | HEIGHT: 63 IN | DIASTOLIC BLOOD PRESSURE: 82 MMHG | HEART RATE: 72 BPM | BODY MASS INDEX: 41.82 KG/M2

## 2022-08-08 VITALS
HEIGHT: 63 IN | WEIGHT: 236 LBS | DIASTOLIC BLOOD PRESSURE: 84 MMHG | SYSTOLIC BLOOD PRESSURE: 144 MMHG | BODY MASS INDEX: 41.82 KG/M2

## 2022-08-08 DIAGNOSIS — I25.118 ATHEROSCLEROSIS OF NATIVE CORONARY ARTERY OF NATIVE HEART WITH STABLE ANGINA PECTORIS (HCC): ICD-10-CM

## 2022-08-08 DIAGNOSIS — I10 ESSENTIAL HYPERTENSION: ICD-10-CM

## 2022-08-08 DIAGNOSIS — I25.10 ATHEROSCLEROSIS OF NATIVE CORONARY ARTERY OF NATIVE HEART WITHOUT ANGINA PECTORIS: Primary | ICD-10-CM

## 2022-08-08 DIAGNOSIS — I25.10 CORONARY ARTERY DISEASE INVOLVING NATIVE HEART WITHOUT ANGINA PECTORIS, UNSPECIFIED VESSEL OR LESION TYPE: ICD-10-CM

## 2022-08-08 DIAGNOSIS — R07.9 CHEST PAIN, UNSPECIFIED TYPE: ICD-10-CM

## 2022-08-08 DIAGNOSIS — N18.31 STAGE 3A CHRONIC KIDNEY DISEASE (HCC): ICD-10-CM

## 2022-08-08 DIAGNOSIS — R06.02 SOB (SHORTNESS OF BREATH): ICD-10-CM

## 2022-08-08 DIAGNOSIS — I20.9 ANGINA, CLASS III (HCC): ICD-10-CM

## 2022-08-08 DIAGNOSIS — E66.01 OBESITY, CLASS III, BMI 40-49.9 (MORBID OBESITY) (HCC): ICD-10-CM

## 2022-08-08 DIAGNOSIS — E78.5 DYSLIPIDEMIA: ICD-10-CM

## 2022-08-08 LAB
ECHO AO ASC DIAM: 3.2 CM
ECHO AO ASCENDING AORTA INDEX: 1.55 CM/M2
ECHO AO ROOT DIAM: 3.6 CM
ECHO AO ROOT INDEX: 1.74 CM/M2
ECHO AV AREA PEAK VELOCITY: 2.9 CM2
ECHO AV AREA VTI: 2.8 CM2
ECHO AV AREA/BSA PEAK VELOCITY: 1.4 CM2/M2
ECHO AV AREA/BSA VTI: 1.4 CM2/M2
ECHO AV MEAN GRADIENT: 6 MMHG
ECHO AV MEAN VELOCITY: 1.1 M/S
ECHO AV PEAK GRADIENT: 11 MMHG
ECHO AV PEAK VELOCITY: 1.6 M/S
ECHO AV VELOCITY RATIO: 0.75
ECHO AV VTI: 34.6 CM
ECHO EST RA PRESSURE: 3 MMHG
ECHO LA DIAMETER INDEX: 2.27 CM/M2
ECHO LA DIAMETER: 4.7 CM
ECHO LA TO AORTIC ROOT RATIO: 1.31
ECHO LA VOL 2C: 67 ML (ref 22–52)
ECHO LA VOL 4C: 75 ML (ref 22–52)
ECHO LA VOL BP: 72 ML (ref 22–52)
ECHO LA VOL/BSA BIPLANE: 35 ML/M2 (ref 16–34)
ECHO LA VOLUME AREA LENGTH: 75 ML
ECHO LA VOLUME INDEX A2C: 32 ML/M2 (ref 16–34)
ECHO LA VOLUME INDEX A4C: 36 ML/M2 (ref 16–34)
ECHO LA VOLUME INDEX AREA LENGTH: 36 ML/M2 (ref 16–34)
ECHO LV E' LATERAL VELOCITY: 7 CM/S
ECHO LV E' SEPTAL VELOCITY: 5 CM/S
ECHO LV FRACTIONAL SHORTENING: 31 % (ref 28–44)
ECHO LV INTERNAL DIMENSION DIASTOLE INDEX: 3.09 CM/M2
ECHO LV INTERNAL DIMENSION DIASTOLIC: 6.4 CM (ref 3.9–5.3)
ECHO LV INTERNAL DIMENSION SYSTOLIC INDEX: 2.13 CM/M2
ECHO LV INTERNAL DIMENSION SYSTOLIC: 4.4 CM
ECHO LV IVSD: 1.1 CM (ref 0.6–0.9)
ECHO LV MASS 2D: 311.7 G (ref 67–162)
ECHO LV MASS INDEX 2D: 150.6 G/M2 (ref 43–95)
ECHO LV POSTERIOR WALL DIASTOLIC: 1.1 CM (ref 0.6–0.9)
ECHO LV RELATIVE WALL THICKNESS RATIO: 0.34
ECHO LVOT AREA: 3.8 CM2
ECHO LVOT AV VTI INDEX: 0.72
ECHO LVOT DIAM: 2.2 CM
ECHO LVOT MEAN GRADIENT: 3 MMHG
ECHO LVOT PEAK GRADIENT: 6 MMHG
ECHO LVOT PEAK VELOCITY: 1.2 M/S
ECHO LVOT STROKE VOLUME INDEX: 45.5 ML/M2
ECHO LVOT SV: 94.2 ML
ECHO LVOT VTI: 24.8 CM
ECHO MV A VELOCITY: 0.95 M/S
ECHO MV AREA PHT: 4 CM2
ECHO MV E DECELERATION TIME (DT): 187.9 MS
ECHO MV E VELOCITY: 0.64 M/S
ECHO MV E/A RATIO: 0.67
ECHO MV E/E' LATERAL: 9.14
ECHO MV E/E' RATIO (AVERAGED): 10.97
ECHO MV E/E' SEPTAL: 12.8
ECHO MV PRESSURE HALF TIME (PHT): 54.5 MS
ECHO RIGHT VENTRICULAR SYSTOLIC PRESSURE (RVSP): 31 MMHG
ECHO RV INTERNAL DIMENSION: 3.5 CM
ECHO RV TAPSE: 2.4 CM (ref 1.7–?)
ECHO TV REGURGITANT MAX VELOCITY: 2.64 M/S
ECHO TV REGURGITANT PEAK GRADIENT: 28 MMHG

## 2022-08-08 PROCEDURE — G0463 HOSPITAL OUTPT CLINIC VISIT: HCPCS | Performed by: SPECIALIST

## 2022-08-08 PROCEDURE — 1101F PT FALLS ASSESS-DOCD LE1/YR: CPT | Performed by: SPECIALIST

## 2022-08-08 PROCEDURE — 93306 TTE W/DOPPLER COMPLETE: CPT | Performed by: SPECIALIST

## 2022-08-08 PROCEDURE — G8536 NO DOC ELDER MAL SCRN: HCPCS | Performed by: SPECIALIST

## 2022-08-08 PROCEDURE — 1090F PRES/ABSN URINE INCON ASSESS: CPT | Performed by: SPECIALIST

## 2022-08-08 PROCEDURE — G8417 CALC BMI ABV UP PARAM F/U: HCPCS | Performed by: SPECIALIST

## 2022-08-08 PROCEDURE — G8432 DEP SCR NOT DOC, RNG: HCPCS | Performed by: SPECIALIST

## 2022-08-08 PROCEDURE — 99214 OFFICE O/P EST MOD 30 MIN: CPT | Performed by: SPECIALIST

## 2022-08-08 PROCEDURE — G8427 DOCREV CUR MEDS BY ELIG CLIN: HCPCS | Performed by: SPECIALIST

## 2022-08-08 PROCEDURE — 1123F ACP DISCUSS/DSCN MKR DOCD: CPT | Performed by: SPECIALIST

## 2022-08-08 RX ORDER — AMLODIPINE BESYLATE 5 MG/1
5 TABLET ORAL DAILY
COMMUNITY

## 2022-08-08 RX ORDER — FAMOTIDINE 20 MG/1
20 TABLET, FILM COATED ORAL 2 TIMES DAILY
COMMUNITY

## 2022-08-08 RX ORDER — SODIUM ZIRCONIUM CYCLOSILICATE 10 G/10G
POWDER, FOR SUSPENSION ORAL DAILY
COMMUNITY

## 2022-08-08 NOTE — PROGRESS NOTES
Gómez Rae MD. [facility]              Patient: Harley Lang  : 1935      Today's Date: 2022          HISTORY OF PRESENT ILLNESS:     History of Present Illness:  Has chronic FRANKLIN. Has class 2-3 FRANKLIN -- is able to shower and do things at home OK which is an improvement. Had one lightheaded spell - but otherwise OK - /72 at home. PAST MEDICAL HISTORY:     Past Medical History:   Diagnosis Date    Back pain     Chronic kidney disease     Coronary atherosclerosis of native coronary artery     100% LAD stenosis initially medically managed (had seen cardiac surgery). On 13 underwent PCI of  with EZRA    Dyslipidemia     Hematoma     L and R hematoma post cath 13 requiring 5 units PRBC's    IBS (irritable bowel syndrome)     Insomnia     sleep study  unremarkable    Myalgia     had muscle pain on statins    Obesity     Parathyroid adenoma     surgery 2014    Spinal stenosis     surgery     TIA (transient ischemic attack)     possible TIA 3/14, although workup normal    Unspecified essential hypertension          Past Surgical History:   Procedure Laterality Date    HX HYSTERECTOMY      HX LUMBAR FUSION  2013    L4-5    HX TONSILLECTOMY      [place] CARDIAC SURG PROCEDURE UNLIST       LAD              CURRENT MEDICATIONS:      Current Outpatient Medications   Medication Sig Dispense Refill    famotidine (PEPCID) 20 mg tablet Take 20 mg by mouth two (2) times a day. amLODIPine (NORVASC) 5 mg tablet Take 5 mg by mouth in the morning.      sodium zirconium cyclosilicate (Lokelma) 10 gram powder packet Take  by mouth daily.       isosorbide mononitrate ER (IMDUR) 30 mg tablet TAKE 1 TABLET BY MOUTH EVERY DAY 90 Tablet 1    carvediloL (COREG) 25 mg tablet TAKE 1 TABLET BY MOUTH TWICE A  Tablet 4    fluvastatin (LESCOL) 20 mg capsule TAKE 1 CAPSULE BY MOUTH EVERY DAY 90 Capsule 2    furosemide (LASIX) 40 mg tablet Take 40 mg by mouth every Monday, Wednesday, Friday.      mag carb/aluminum hydrox/algin (GAVISCON EXTRA STRENGTH PO) Take 2 Tabs by mouth as needed. co-enzyme Q-10 (CO Q-10) 100 mg capsule Take 200 mg by mouth two (2) times a day. aspirin delayed-release 81 mg tablet Take 1 Tab by mouth daily. 30 Tab 0    fluticasone propionate (FLONASE) 50 mcg/actuation nasal spray 2 Sprays by Both Nostrils route daily. ULORIC 40 mg tab tablet Take 40 mg by mouth daily. Febuxostat (generic)      acetaminophen (TYLENOL) 650 mg TbER Take 650 mg by mouth every eight (8) hours as needed for Pain. loperamide (IMODIUM) 2 mg capsule Take 2 mg by mouth as needed for Diarrhea.      sodium bicarbonate 325 mg tablet Take two tablets in the morning and one tablet in the evening. triamcinolone acetonide (KENALOG) 0.1 % topical cream Apply  to affected area two (2) times daily as needed. use thin layer      L GASSERI/B BIFIDUM/B LONGUM ([employer] PO) Take 1 Cap by mouth daily. therapeutic multivitamin (THERAGRAN) tablet Take 1 Tab by mouth daily. ezetimibe (ZETIA) 10 mg tablet Take 10 mg by mouth nightly. cholecalciferol, vitamin D3, 50 mcg (2,000 unit) tab Take 2,000 Units by mouth two (2) times a day. ferrous sulfate (SLOW FE) 142 mg (45 mg iron) ER tablet Take 142 mg by mouth daily. (Patient not taking: No sig reported)         Allergies   Allergen Reactions    Aloe Vera Rash    Hay Fever And Allergy Relief Runny Nose and Cough    Sulfa (Sulfonamide Antibiotics) Rash and Other (comments)     Upset stomach              SOCIAL HISTORY:     Social History     Tobacco Use    Smoking status: Never    Smokeless tobacco: Never   Substance Use Topics    Alcohol use:  Yes     Alcohol/week: 2.0 standard drinks     Types: 2 Glasses of wine per week    Drug use: No         FAMILY HISTORY:     Family History   Problem Relation Age of Onset    Heart Disease Mother     Stroke Father              REVIEW OF SYMPTOMS:     Review of Symptoms:  Constitutional: Negative for fever, chills  HEENT: Negative for nosebleeds, tinnitus, and vision changes. Respiratory: Negative for cough, wheezing  Cardiovascular: Negative for orthopnea, claudication, syncope, and PND. Gastrointestinal: Negative for abdominal pain, diarrhea, melena. Genitourinary: Negative for dysuria  Musculoskeletal: Negative for myalgias. Skin: Negative for rash  Heme: No problems bleeding. Neurological: Negative for speech change and focal weakness. REVIEW OF SYMPTOMS:       Review of Symptoms:  Constitutional: Negative for fever, chills  HEENT: Negative for nosebleeds, tinnitus, and vision changes. Respiratory: Negative for cough, wheezing  Cardiovascular: Negative for syncope, and PND. Gastrointestinal: Negative for melena.  + IBS  Genitourinary: Negative for dysuria  Musculoskeletal: Negative for myalgias. + joint pain   Skin: + psoriasis  Heme: No problems bleeding. Neurological: Negative for speech change and focal weakness. PHYSICAL EXAM:       Physical Exam:  Visit Vitals  BP (!) 148/82 (BP 1 Location: Left upper arm, BP Patient Position: Sitting, BP Cuff Size: Large adult)   Pulse 72   Ht 5' 3\" (1.6 m)   Wt 236 lb (107 kg)   BMI 41.81 kg/m²          Patient appears generally well, mood and affect are appropriate and pleasant. HEENT: Hearing intact, non-icteric, normocephalic, atraumatic. Neck Exam: Supple, no bruits or JVD  Lung Exam: Clear to auscultation, even breath sounds. Cardiac Exam: Regular rate and rhythm with no murmur  Abdomen: Soft, non-tender, normal bowel sounds. - obese  Extremities: Moves all ext well. 1+ bilat lower extremity edema.   Psych: Appropriate affect  Neuro - Grossly intact                  LABS / OTHER STUDIES:            Labs 8/1/22 - Cr 1.1, K 5.0,           CARDIAC DIAGNOSTICS:             Cardiac Evaluation Includes:    1. Echo, December 2, 2009: Normal left ventricular size and function, EF 55% to 00%, grade I diastolic dysfunction. Minimal aortic valve sclerosis with trace aortic insufficiency. 2. Cardiac cath, December 11, 2009: Significant one-vessel coronary artery disease. There is a 90% proximal LAD lesion over a long segment followed by a total occlusion in the mid LAD. The distal LAD is small to moderate sized and fills with left-to-left collaterals. There is mild disease elsewhere. LVEDP was 16, LVEF 60%. 3. Lexiscan Myoview 2/10: small, partially reversible apical defect; LVEF 80%    4. Lexiscan Cardiolite 6/24/13 - normal MPPI, LVEF 59%    5. Cath 7/9/13 - 100% pLAD stenosis, LVEF 65%    6. Cath 7/25/13 - EZRA to LAD  7. Holter 2/12/15 - Normal  8. Echo 12/6/15 - LVEF 60 % to 65 %. Grade 1 diastolic dysfunction. Mild LAE  9. CATH: 12/7/2015: L Main: Nml, LAD: Stent patent; dLAD: small - patent/diffuse dz; o/pD1/D2 mild dz, LCflex: Med to large; MLI; OM1 and OM2 - MLI; RCA: Large; pRCA: 80%;PLB and PDA - med; PDA - prox diffuse 40%, LVEDP: 18, LVEF: 55%  ---- s/p EZRA (3 x 23) -> pRCA        Echo 12/2/20 - LVEF 60%. Grade 1 diastology. Mod LAE. Asc ao 3.6 cm     Echo 8/8/22 - PRELIM - stable         EKG 10/31/19 - NSR, PRWP  EKG 11/12/20 - NSR, 1st degree AVB, PRWP     EKG 1/26/22 - NSR, PRWP, 1st degree AVB          ASSESSMENT AND PLAN:         Assessment and Plan:   1) CAD and chest pain  - Cardiac Cath Dec 2009 - Significant 1 vessel CAD - 90% proximal LAD lesion followed by total mid-LAD lesion with distal LAD filling by left to left collatorals and right to distal left collaterals. - She was managed medically for years (after seeing cardiac surgery). - Due to worsening symptoms she proceeded to PCI of  on 7/25/13 by Dr. Roxann Mendez. Her breathing improved s/p PCI. - She was pain free after PCI in 2013 until about November 1, 2015. Since then she's had some typical chest pain. She also has indigestion which is different. She was eventually admitted for chest pain and had a cath. - CATH: 12/7/2015: L Main: Nml, LAD: Stent patent; dLAD: small - patent/diffuse dz; o/pD1/D2 mild dz, LCflex: Med to large; MLI; OM1 and OM2 - MLI; RCA: Large; pRCA: 80%;PLB and PDA - med; PDA - prox diffuse 40%, LVEDP: 18, LVEF: 55%  ---- s/p EZRA (3 x 23) -> pRCA    - She was admitted to [address] [place] [address] in June 2016 - workup there OK per patient  - No further chest pain that she had before  - On 8/8/22 - has class 2-3 FRANKLIN, but better than a couple of years ago. Cont lasix every other day. - cont ASA, statin, BB, Imdur       2) HTN    - BP OK at home --  130's/70s at home --- continue medications and follow BP at home       3) Heart racing  - continue beta-blockers  - Holter 2/15 was normal    - She is doing better lately      4) Stage 3 CKD  - followed by Dr. Cady Mirza      5) Dyslipidemia  - She is tolerating fluvastatin (could not tolerate other statins) and Zetia. - Prior lipids look good - followed by PCP. 6) See me in 12 months. She has 2 daughters - living with the youngest.   Has 3 great grandkids. Anna Anderson MD, [address]  [address] [facility], [address]      [address]  [address]  [place], [address] [place] [address]  Ph: [phone]                               Ph [phone]

## 2022-08-08 NOTE — PROGRESS NOTES
Chief Complaint   Patient presents with    Follow-up     6 months; Echo w Katie    Hypertension    Cholesterol Problem    Coronary Artery Disease     Vitals:    08/08/22 1451 08/08/22 1505   BP: (!) 144/84 (!) 148/82   BP 1 Location:  Left upper arm   BP Patient Position:  Sitting   BP Cuff Size:  Large adult   Pulse: 72    Height: 5' 3\" (1.6 m)    Weight: 236 lb (107 kg)      Chest pain denied   SOB FRANKLIN baseline  Palpitations denied   Swelling in hands/feet denied   Dizziness this past week had an episode, better since then  Recent hospital stays saw Nephrologist, had labs drawn last Thursday. Refills denied       Home BP has been 138/70.

## 2022-08-15 RX ORDER — ISOSORBIDE MONONITRATE 30 MG/1
TABLET, EXTENDED RELEASE ORAL
Qty: 90 TABLET | Refills: 3 | Status: SHIPPED | OUTPATIENT
Start: 2022-08-15

## 2022-08-15 NOTE — TELEPHONE ENCOUNTER
Refill per VO of Dr. Caro Renteria  Last appt: Visit date not found  Future Appointments   Date Time Provider Shannon Hancock   8/10/2023  1:40 PM Sarah Johnson MD [facility] AMB       Requested Prescriptions     Pending Prescriptions Disp Refills    isosorbide mononitrate ER (IMDUR) 30 mg tablet [Pharmacy Med Name: Chet Idalmis ER 30 MG TB] 90 Tablet 1     Sig: TAKE 1 TABLET BY MOUTH EVERY DAY

## 2022-10-17 RX ORDER — FLUVASTATIN 20 MG/1
CAPSULE ORAL
Qty: 90 CAPSULE | Refills: 3 | Status: SHIPPED | OUTPATIENT
Start: 2022-10-17

## 2022-10-17 NOTE — TELEPHONE ENCOUNTER
Refill per VO of Dr. Miles Gómez  Last appt: 8/8/22  Future Appointments   Date Time Provider Shannon Hancock   8/10/2023  1:40 PM Danielle Bob MD [facility] AMB       Requested Prescriptions     Signed Prescriptions Disp Refills    fluvastatin (LESCOL) 20 mg capsule 90 Capsule 3     Sig: TAKE 1 CAPSULE BY MOUTH EVERY DAY     Authorizing Provider: Wyatt Araya User: Sofia Ribera

## 2023-08-02 RX ORDER — ISOSORBIDE MONONITRATE 30 MG/1
TABLET, EXTENDED RELEASE ORAL
Qty: 90 TABLET | Refills: 0 | Status: SHIPPED | OUTPATIENT
Start: 2023-08-02

## 2023-08-02 NOTE — TELEPHONE ENCOUNTER
Requested Prescriptions     Signed Prescriptions Disp Refills    isosorbide mononitrate (IMDUR) 30 MG extended release tablet 90 tablet 0     Sig: TAKE 1 TABLET BY MOUTH EVERY DAY     Authorizing Provider: Kanu Lerma     Ordering User: Vanesa Orellana

## 2023-08-10 ENCOUNTER — OFFICE VISIT (OUTPATIENT)
Age: 88
End: 2023-08-10
Payer: MEDICARE

## 2023-08-10 VITALS
HEART RATE: 67 BPM | BODY MASS INDEX: 39.16 KG/M2 | HEIGHT: 63 IN | WEIGHT: 221 LBS | DIASTOLIC BLOOD PRESSURE: 70 MMHG | SYSTOLIC BLOOD PRESSURE: 128 MMHG | OXYGEN SATURATION: 95 %

## 2023-08-10 DIAGNOSIS — E78.2 MIXED DYSLIPIDEMIA: ICD-10-CM

## 2023-08-10 DIAGNOSIS — I10 ESSENTIAL HYPERTENSION: ICD-10-CM

## 2023-08-10 DIAGNOSIS — E66.01 MORBID (SEVERE) OBESITY DUE TO EXCESS CALORIES (HCC): ICD-10-CM

## 2023-08-10 DIAGNOSIS — I25.118 CORONARY ARTERY DISEASE OF NATIVE ARTERY OF NATIVE HEART WITH STABLE ANGINA PECTORIS (HCC): Primary | ICD-10-CM

## 2023-08-10 PROCEDURE — 1036F TOBACCO NON-USER: CPT | Performed by: SPECIALIST

## 2023-08-10 PROCEDURE — 93010 ELECTROCARDIOGRAM REPORT: CPT | Performed by: SPECIALIST

## 2023-08-10 PROCEDURE — 1123F ACP DISCUSS/DSCN MKR DOCD: CPT | Performed by: SPECIALIST

## 2023-08-10 PROCEDURE — 93005 ELECTROCARDIOGRAM TRACING: CPT | Performed by: SPECIALIST

## 2023-08-10 PROCEDURE — 1090F PRES/ABSN URINE INCON ASSESS: CPT | Performed by: SPECIALIST

## 2023-08-10 PROCEDURE — G8417 CALC BMI ABV UP PARAM F/U: HCPCS | Performed by: SPECIALIST

## 2023-08-10 PROCEDURE — 99214 OFFICE O/P EST MOD 30 MIN: CPT | Performed by: SPECIALIST

## 2023-08-10 PROCEDURE — G8427 DOCREV CUR MEDS BY ELIG CLIN: HCPCS | Performed by: SPECIALIST

## 2023-08-10 NOTE — PROGRESS NOTES
Wanda Rubio MD. [facility]          Patient: Jethro Gosselin  : 1935      Today's Date: 8/10/2023        HISTORY OF PRESENT ILLNESS:     History of Present Illness:  She had a fall 2 weeks - at night while using bathroom - went to [address] [facility] stayed a couple of days - at [address] [facility] - workup was OK. PAST MEDICAL HISTORY:     Past Medical History:   Diagnosis Date    Back pain     Chronic kidney disease     Coronary atherosclerosis of native coronary artery     100% LAD stenosis initially medically managed (had seen cardiac surgery).   On 13 underwent PCI of  with JENNIFER    Dyslipidemia     Hematoma     L and R hematoma post cath 13 requiring 5 units PRBC's    IBS (irritable bowel syndrome)     Insomnia     sleep study  unremarkable    Myalgia     had muscle pain on statins    Obesity     Parathyroid adenoma     surgery 2014    Spinal stenosis     surgery     TIA (transient ischemic attack)     possible TIA 3/14, although workup normal    Unspecified essential hypertension        Past Surgical History:   Procedure Laterality Date    [address]STERECTOMY ([address])      LUMBAR FUSION  2013    L4-5    [place] UNLISTED PROCEDURE CARDIAC SURGERY       LAD 2009    TONSILLECTOMY               CURRENT MEDICATIONS:    .  Current Outpatient Medications   Medication Sig Dispense Refill    Multiple Vitamin (MULTIVITAMIN ADULT PO) Take by mouth      Probiotic Product (PROBIOTIC PO) Take by mouth      isosorbide mononitrate (IMDUR) 30 MG extended release tablet TAKE 1 TABLET BY MOUTH EVERY DAY 90 tablet 0    acetaminophen (TYLENOL) 650 MG extended release tablet Take 1 tablet by mouth every 8 hours as needed      amLODIPine (NORVASC) 5 MG tablet Take 1 tablet by mouth daily      aspirin 81 MG EC tablet Take 1 tablet by mouth daily      carvedilol (COREG) 25 MG tablet Take 1 tablet by mouth 2 times daily      coenzyme Q10 100 MG CAPS capsule Take 2 capsules by mouth 2 times daily

## 2023-08-10 NOTE — PROGRESS NOTES
Astrid Gaytan is a 80 y.o. female    had concerns including Hypertension. Vitals:    08/10/23 1408   BP: 128/70   Site: Left Upper Arm   Position: Sitting   Pulse: 67   SpO2: 95%   Weight: 221 lb (100.2 kg)   Height: 5' 3\" (1.6 m)        Chest pain NO    SOB PT STATES YES WHEN GETTING UP AND WALKING ANYWHERE (BEEN GOING ON FOR YEARS)    Dizziness PT STATES YES BECAUSE HER BLOOD PRESSURE DROPPED ON Tuesday (PT STATES SHE WAS SITTING AFTER SHE HAD WENT TO THE BATHROOM AND CAME BACK)    Swelling PT STATES YES RIGHT LEG SOMETIMES BUT NOT CONSTANTLY     Palpitations NO    Refills NO    Covid Vaccinated YES       1. Have you been to the ER, urgent care clinic since your last visit? Hospitalized since your last visit? YES TWO WEEKS AGO DUE TO FALLING AND HITTING HER HEAD     2. Have you seen or consulted any other health care providers outside of the [address] [place] [address] since your last visit? Include any pap smears or colon screening.  YES [facility] (Tran Villanueva)

## 2023-10-10 RX ORDER — FLUVASTATIN 20 MG/1
CAPSULE ORAL DAILY
Qty: 90 CAPSULE | Refills: 3 | Status: SHIPPED | OUTPATIENT
Start: 2023-10-10

## 2023-10-30 RX ORDER — ISOSORBIDE MONONITRATE 30 MG/1
TABLET, EXTENDED RELEASE ORAL
Qty: 90 TABLET | Refills: 3 | Status: SHIPPED | OUTPATIENT
Start: 2023-10-30

## 2023-10-30 NOTE — TELEPHONE ENCOUNTER
Refill per VO of Dr. Alyson Duval  Last appt: 8/10/2023    Future Appointments   Date Time Provider [address]   8/12/2024  2:20 PM Kanu Lerma MD [facility] AMB       Requested Prescriptions     Signed Prescriptions Disp Refills    isosorbide mononitrate (IMDUR) 30 MG extended release tablet 90 tablet 3     Sig: TAKE 1 TABLET BY MOUTH EVERY DAY     Authorizing Provider: Kanu Lerma     Ordering User: Magi Sullivan

## 2024-09-16 ENCOUNTER — OFFICE VISIT (OUTPATIENT)
Age: 89
End: 2024-09-16
Payer: MEDICARE

## 2024-09-16 VITALS
WEIGHT: 211.6 LBS | OXYGEN SATURATION: 96 % | SYSTOLIC BLOOD PRESSURE: 138 MMHG | HEIGHT: 63 IN | HEART RATE: 67 BPM | BODY MASS INDEX: 37.49 KG/M2 | DIASTOLIC BLOOD PRESSURE: 68 MMHG

## 2024-09-16 DIAGNOSIS — R60.9 EDEMA, UNSPECIFIED TYPE: ICD-10-CM

## 2024-09-16 DIAGNOSIS — N18.31 STAGE 3A CHRONIC KIDNEY DISEASE (HCC): ICD-10-CM

## 2024-09-16 DIAGNOSIS — I10 ESSENTIAL HYPERTENSION: Primary | ICD-10-CM

## 2024-09-16 PROCEDURE — 1036F TOBACCO NON-USER: CPT | Performed by: SPECIALIST

## 2024-09-16 PROCEDURE — 1090F PRES/ABSN URINE INCON ASSESS: CPT | Performed by: SPECIALIST

## 2024-09-16 PROCEDURE — G8427 DOCREV CUR MEDS BY ELIG CLIN: HCPCS | Performed by: SPECIALIST

## 2024-09-16 PROCEDURE — 99214 OFFICE O/P EST MOD 30 MIN: CPT | Performed by: SPECIALIST

## 2024-09-16 PROCEDURE — 93010 ELECTROCARDIOGRAM REPORT: CPT | Performed by: SPECIALIST

## 2024-09-16 PROCEDURE — 93005 ELECTROCARDIOGRAM TRACING: CPT | Performed by: SPECIALIST

## 2024-09-16 PROCEDURE — 1123F ACP DISCUSS/DSCN MKR DOCD: CPT | Performed by: SPECIALIST

## 2024-09-16 PROCEDURE — G8417 CALC BMI ABV UP PARAM F/U: HCPCS | Performed by: SPECIALIST

## 2024-09-16 RX ORDER — OLMESARTAN MEDOXOMIL 20 MG/1
20 TABLET ORAL DAILY
Qty: 90 TABLET | Refills: 3 | Status: SHIPPED | OUTPATIENT
Start: 2024-09-16

## 2024-10-10 LAB
BUN SERPL-MCNC: 22 MG/DL (ref 8–27)
BUN/CREAT SERPL: 24 (ref 12–28)
CALCIUM SERPL-MCNC: 10.3 MG/DL (ref 8.7–10.3)
CHLORIDE SERPL-SCNC: 103 MMOL/L (ref 96–106)
CO2 SERPL-SCNC: 19 MMOL/L (ref 20–29)
CREAT SERPL-MCNC: 0.9 MG/DL (ref 0.57–1)
EGFRCR SERPLBLD CKD-EPI 2021: 61 ML/MIN/1.73
GLUCOSE SERPL-MCNC: 88 MG/DL (ref 70–99)
POTASSIUM SERPL-SCNC: 5 MMOL/L (ref 3.5–5.2)
SODIUM SERPL-SCNC: 137 MMOL/L (ref 134–144)

## 2024-10-14 RX ORDER — FLUVASTATIN 20 MG/1
CAPSULE ORAL DAILY
Qty: 90 CAPSULE | Refills: 3 | Status: SHIPPED | OUTPATIENT
Start: 2024-10-14

## 2024-10-14 NOTE — TELEPHONE ENCOUNTER
Refill per VO of Dr. De Jesus  Last appt: 9/16/2024    Future Appointments   Date Time Provider Department Center   10/29/2024  3:20 PM Cheyenne Fox, APRN - NP [facility] AM[facility]       Requested Prescriptions     Signed Prescriptions Disp Refills    fluvastatin (LESCOL) 20 MG capsule 90 capsule 3     Sig: TAKE 1 CAPSULE BY MOUTH EVERY DAY     Authorizing Provider: DANIEL DE JESUS     Ordering User: JR KAY

## 2024-10-22 ENCOUNTER — CLINICAL DOCUMENTATION (OUTPATIENT)
Age: 89
End: 2024-10-22

## 2024-10-22 NOTE — PROGRESS NOTES
Labs reviewed from 12/6/23 - Hgb 11.3, HCT 32.9  , , HDL 55, LDL 53  Na 140, BUN 23, Cr 1.08, K 5.2

## 2024-10-24 ENCOUNTER — TELEPHONE (OUTPATIENT)
Age: 88
End: 2024-10-24

## 2024-10-24 NOTE — TELEPHONE ENCOUNTER
Patient is being treated at the [facility]. Patient daughter Maria Elena believes that fluvastatin 20mg makes the patient feel dizzy which caused the patient to fall.

## 2024-10-25 RX ORDER — ISOSORBIDE MONONITRATE 30 MG/1
TABLET, EXTENDED RELEASE ORAL
Qty: 90 TABLET | Refills: 3 | Status: SHIPPED | OUTPATIENT
Start: 2024-10-25

## 2024-10-25 NOTE — TELEPHONE ENCOUNTER
Refill per VO of Dr. De Jesus  Last appt: 09/16/2024 NOV-10/29/24    Requested Prescriptions     Pending Prescriptions Disp Refills    isosorbide mononitrate (IMDUR) 30 MG extended release tablet [Pharmacy Med Name: ISOSORBIDE MONONIT ER 30 MG TB] 90 tablet 3     Sig: TAKE 1 TABLET BY MOUTH EVERY DAY

## 2024-11-12 ENCOUNTER — TELEPHONE (OUTPATIENT)
Age: 89
End: 2024-11-12

## 2024-11-12 NOTE — TELEPHONE ENCOUNTER
No future appointments.     ID verified using two patient identifiers.  Writer spoke with daughter who reported her mother is currently in a rehab from a hospital stay and they will be bringing patient home soon. Daughter wanting to review medications since some were stopped while in hospital. Call transferred to PSR to schedule an appointment.

## 2024-11-12 NOTE — TELEPHONE ENCOUNTER
Patient's daughter Renee is calling because  her mother is retaining fluid in her legs and ankles.Patient was taken off of Carvedilol and Olmesartan while she was in the hospital.Patient's daughter said her mother blood pressure has been fluctuating all over the place.Please give a call    [phone] Renee (daughter)

## 2024-11-18 ENCOUNTER — OFFICE VISIT (OUTPATIENT)
Age: 89
End: 2024-11-18
Payer: MEDICARE

## 2024-11-18 VITALS
BODY MASS INDEX: 35.26 KG/M2 | OXYGEN SATURATION: 90 % | SYSTOLIC BLOOD PRESSURE: 126 MMHG | HEIGHT: 63 IN | HEART RATE: 89 BPM | DIASTOLIC BLOOD PRESSURE: 76 MMHG | WEIGHT: 199 LBS

## 2024-11-18 DIAGNOSIS — R42 DIZZINESS: Primary | ICD-10-CM

## 2024-11-18 DIAGNOSIS — N18.31 STAGE 3A CHRONIC KIDNEY DISEASE (HCC): ICD-10-CM

## 2024-11-18 DIAGNOSIS — I10 ESSENTIAL HYPERTENSION: ICD-10-CM

## 2024-11-18 DIAGNOSIS — I95.1 ORTHOSTATIC HYPOTENSION: ICD-10-CM

## 2024-11-18 DIAGNOSIS — I89.0 SECONDARY LYMPHEDEMA: ICD-10-CM

## 2024-11-18 DIAGNOSIS — I25.118 CORONARY ARTERY DISEASE OF NATIVE ARTERY OF NATIVE HEART WITH STABLE ANGINA PECTORIS (HCC): ICD-10-CM

## 2024-11-18 PROCEDURE — 1123F ACP DISCUSS/DSCN MKR DOCD: CPT | Performed by: NURSE PRACTITIONER

## 2024-11-18 PROCEDURE — 99214 OFFICE O/P EST MOD 30 MIN: CPT | Performed by: NURSE PRACTITIONER

## 2024-11-18 PROCEDURE — G8484 FLU IMMUNIZE NO ADMIN: HCPCS | Performed by: NURSE PRACTITIONER

## 2024-11-18 PROCEDURE — G8427 DOCREV CUR MEDS BY ELIG CLIN: HCPCS | Performed by: NURSE PRACTITIONER

## 2024-11-18 PROCEDURE — 1036F TOBACCO NON-USER: CPT | Performed by: NURSE PRACTITIONER

## 2024-11-18 PROCEDURE — 1126F AMNT PAIN NOTED NONE PRSNT: CPT | Performed by: NURSE PRACTITIONER

## 2024-11-18 PROCEDURE — G8417 CALC BMI ABV UP PARAM F/U: HCPCS | Performed by: NURSE PRACTITIONER

## 2024-11-18 PROCEDURE — 1090F PRES/ABSN URINE INCON ASSESS: CPT | Performed by: NURSE PRACTITIONER

## 2024-11-18 RX ORDER — HYDRALAZINE HYDROCHLORIDE 10 MG/1
10 TABLET, FILM COATED ORAL 2 TIMES DAILY
Qty: 180 TABLET | Refills: 0
Start: 2024-11-18

## 2024-11-18 RX ORDER — SUCRALFATE 1 G/1
1 TABLET ORAL 4 TIMES DAILY
COMMUNITY

## 2024-11-18 RX ORDER — THIAMINE HYDROCHLORIDE 100 MG/ML
100 INJECTION, SOLUTION INTRAMUSCULAR; INTRAVENOUS DAILY
COMMUNITY

## 2024-11-18 RX ORDER — LANOLIN ALCOHOL/MO/W.PET/CERES
1000 CREAM (GRAM) TOPICAL DAILY
COMMUNITY

## 2024-11-18 RX ORDER — HYDRALAZINE HYDROCHLORIDE 10 MG/1
10 TABLET, FILM COATED ORAL 3 TIMES DAILY
COMMUNITY
End: 2024-11-18 | Stop reason: SDUPTHER

## 2024-11-18 RX ORDER — MULTIVIT-MIN/IRON/FOLIC ACID/K 18-600-40
2000 CAPSULE ORAL DAILY
COMMUNITY

## 2024-11-18 ASSESSMENT — PATIENT HEALTH QUESTIONNAIRE - PHQ9
SUM OF ALL RESPONSES TO PHQ QUESTIONS 1-9: 0
SUM OF ALL RESPONSES TO PHQ9 QUESTIONS 1 & 2: 0
1. LITTLE INTEREST OR PLEASURE IN DOING THINGS: NOT AT ALL
SUM OF ALL RESPONSES TO PHQ QUESTIONS 1-9: 0
2. FEELING DOWN, DEPRESSED OR HOPELESS: NOT AT ALL
SUM OF ALL RESPONSES TO PHQ QUESTIONS 1-9: 0
SUM OF ALL RESPONSES TO PHQ QUESTIONS 1-9: 0

## 2024-11-18 NOTE — PATIENT INSTRUCTIONS
Please check your blood pressure daily (at least one hour after your morning blood pressure medications.)  Keep a written record of your blood pressure readings and bring it to each appointment.  If your systolic blood pressure is consistently greater than 150mmHg or less than 110mmHg then please call the office at [phone].    Please call the office next week to make sure we received your records from [facility]

## 2024-11-18 NOTE — PROGRESS NOTES
Patient: Desiree Valero  : 1935    Primary Cardiologist: Scott De Jesus MD. [facility]    Today's Date: 2024    CC: hospital follow up     ASSESSMENT & PLAN:     1) CAD    - Cardiac Cath Dec 2009 - Significant 1 vessel CAD - 90% proximal LAD lesion followed by total mid-LAD lesion with distal LAD filling by left to left collatorals and right to distal left collaterals.      - She was managed medically for years (after seeing cardiac surgery).     - Due to worsening symptoms she proceeded to PCI of  on 13 by Dr. Gregg. Her breathing improved s/p PCI.     - She was pain free after PCI in  until about 2015. Then she had chest pain and episodes of indigestion.  Eventually she underwent CATH: 2015: L Main: Nml, LAD: Stent patent; dLAD: small - patent/diffuse dz; o/pD1/D2 mild dz, LCflex: Med to large; MLI; OM1 and OM2 - MLI; RCA: Large; pRCA: 80%;PLB and PDA - med; PDA - prox  diffuse 40%, LVEDP: 18, LVEF: 55%  ---- s/p JENNIFER (3 x 23) -> pRCA     - She was admitted to [facility] in 2016, unremarkable workup per patient    - Currently asymptomatic, continue ASA, coreg, statin and imdur       2) HTN    - Nephrology stopped her lasix , then angela  amlodipine stopped for edema  - Never started olmesartan, during recent admission she had hypotension  - Now well controlled on coreg, imdur and hydralazine 10mg BID  - Continue to monitor BP      3) Hx of palpitations  -Now controlled on coreg 25mg BID  - Holter 2/15 was normal         4) Stage 3 CKD   - followed by Nephrology/Dr. Espinosa        5) Dyslipidemia   - She is tolerating fluvastatin (could not tolerate other statins) and Zetia.     - Lipids followed by PCP .     6) Syncope - admitted at  in  and workup negative  -Now admission at [facility] for dizziness/fall in 10/24, had severe orthostatic hypotension per daughter, unfortunately I do not have any records from her hospitalization and the patient doesn't

## 2024-11-18 NOTE — PROGRESS NOTES
1. Have you been to the ER, urgent care clinic since your last visit?  Hospitalized since your last visit?Yes When: OCT 23RD, [facility]. WENT TO REHAB ON NOV 2ND  Reason for visit: FALL, WAS FOUND 8 HOURS AFTER FALL.     2. Have you seen or consulted any other health care providers outside of the [facility] since your last visit?  Include any pap smears or colon screening. No

## 2024-11-19 ENCOUNTER — TELEPHONE (OUTPATIENT)
Age: 88
End: 2024-11-19

## 2024-11-19 NOTE — TELEPHONE ENCOUNTER
Please request records from [facility] on this patient for her recent hospitalization and ask them to fax them to me: Fax [phone]  Dr. Liza macario

## 2024-11-21 NOTE — TELEPHONE ENCOUNTER
Patients daughter called in to let NP know that her oxygen levels with physical therapy is at 90 and therapy is concerned about it, please follow up when you get a opportunity    P#: [phone]

## 2024-11-22 ENCOUNTER — TELEPHONE (OUTPATIENT)
Age: 88
End: 2024-11-22

## 2024-11-22 NOTE — TELEPHONE ENCOUNTER
Please call patient    Received records from [facility] after patient's office visit  Coreg was stopped during admission due to low BP and low heart rate  Patient was taking coreg 25mg BID at her office visit with me  Please advise her to reduce coreg to 6.25mg BID and send new Rx to her pharmacy    Please set her up to see Rosalina in late December for BP and heart rate check with ECG    Future Appointments   Date Time Provider Department Center   2/20/2025  2:40 PM Scott De Jesus MD [facility]

## 2024-11-25 NOTE — TELEPHONE ENCOUNTER
Saw Dr. Espinosa, O2 94%.  Monitor through PT x2 days per week.  Discussed to watch if drops under 88%, will reach out to PCP.

## 2024-11-25 NOTE — TELEPHONE ENCOUNTER
Called pt's daughter  \"Please advise her to reduce coreg to 6.25mg BID and send new Rx to her pharmacy     Please set her up to see Rosalina in late December for BP and heart rate check with ECG\"    Spoke with pt's daughter (HIPAA approved),    Currently off Carvedilol, Olmesartan    Future Appointments   Date Time Provider Department Center   1/13/2025  3:20 PM Scott De Jesus MD [facility]   2/20/2025  2:40 PM Scott De Jesus MD [facility] AM[facility]     Asked them to follow vitals, let us know if concerning.

## 2025-02-13 ENCOUNTER — TELEPHONE (OUTPATIENT)
Age: 89
End: 2025-02-13